# Patient Record
Sex: MALE | Race: OTHER | NOT HISPANIC OR LATINO
[De-identification: names, ages, dates, MRNs, and addresses within clinical notes are randomized per-mention and may not be internally consistent; named-entity substitution may affect disease eponyms.]

---

## 2017-03-15 ENCOUNTER — TRANSCRIPTION ENCOUNTER (OUTPATIENT)
Age: 52
End: 2017-03-15

## 2017-03-15 ENCOUNTER — INPATIENT (INPATIENT)
Facility: HOSPITAL | Age: 52
LOS: 6 days | Discharge: HOME | DRG: 234 | End: 2017-03-22
Attending: THORACIC SURGERY (CARDIOTHORACIC VASCULAR SURGERY) | Admitting: INTERNAL MEDICINE
Payer: COMMERCIAL

## 2017-03-15 ENCOUNTER — INPATIENT (INPATIENT)
Facility: HOSPITAL | Age: 52
LOS: 0 days | Discharge: ANOTHER HOSPITAL | DRG: 281 | End: 2017-03-15
Attending: INTERNAL MEDICINE | Admitting: INTERNAL MEDICINE
Payer: COMMERCIAL

## 2017-03-15 VITALS
TEMPERATURE: 98 F | OXYGEN SATURATION: 100 % | RESPIRATION RATE: 16 BRPM | HEIGHT: 66 IN | WEIGHT: 186.95 LBS | SYSTOLIC BLOOD PRESSURE: 153 MMHG | HEART RATE: 102 BPM | DIASTOLIC BLOOD PRESSURE: 70 MMHG

## 2017-03-15 VITALS
TEMPERATURE: 98 F | SYSTOLIC BLOOD PRESSURE: 123 MMHG | HEART RATE: 81 BPM | RESPIRATION RATE: 16 BRPM | OXYGEN SATURATION: 99 % | DIASTOLIC BLOOD PRESSURE: 76 MMHG

## 2017-03-15 VITALS
HEART RATE: 83 BPM | OXYGEN SATURATION: 100 % | HEIGHT: 66 IN | DIASTOLIC BLOOD PRESSURE: 83 MMHG | RESPIRATION RATE: 16 BRPM | WEIGHT: 186.95 LBS | SYSTOLIC BLOOD PRESSURE: 138 MMHG

## 2017-03-15 DIAGNOSIS — I82.409 ACUTE EMBOLISM AND THROMBOSIS OF UNSPECIFIED DEEP VEINS OF UNSPECIFIED LOWER EXTREMITY: ICD-10-CM

## 2017-03-15 DIAGNOSIS — I21.4 NON-ST ELEVATION (NSTEMI) MYOCARDIAL INFARCTION: ICD-10-CM

## 2017-03-15 DIAGNOSIS — C96.9 MALIGNANT NEOPLASM OF LYMPHOID, HEMATOPOIETIC AND RELATED TISSUE, UNSPECIFIED: ICD-10-CM

## 2017-03-15 DIAGNOSIS — I24.9 ACUTE ISCHEMIC HEART DISEASE, UNSPECIFIED: ICD-10-CM

## 2017-03-15 DIAGNOSIS — I26.99 OTHER PULMONARY EMBOLISM WITHOUT ACUTE COR PULMONALE: ICD-10-CM

## 2017-03-15 DIAGNOSIS — E11.9 TYPE 2 DIABETES MELLITUS WITHOUT COMPLICATIONS: ICD-10-CM

## 2017-03-15 DIAGNOSIS — E78.5 HYPERLIPIDEMIA, UNSPECIFIED: ICD-10-CM

## 2017-03-15 DIAGNOSIS — R07.9 CHEST PAIN, UNSPECIFIED: ICD-10-CM

## 2017-03-15 LAB
ALBUMIN SERPL ELPH-MCNC: 3.1 G/DL — LOW (ref 3.5–5)
ALBUMIN SERPL ELPH-MCNC: 3.3 G/DL — LOW (ref 3.5–5)
ALBUMIN SERPL ELPH-MCNC: 3.7 G/DL — SIGNIFICANT CHANGE UP (ref 3.3–5)
ALP SERPL-CCNC: 34 U/L — LOW (ref 40–120)
ALP SERPL-CCNC: 36 U/L — LOW (ref 40–120)
ALP SERPL-CCNC: 40 U/L — SIGNIFICANT CHANGE UP (ref 40–120)
ALT FLD-CCNC: 29 U/L RC — SIGNIFICANT CHANGE UP (ref 10–45)
ALT FLD-CCNC: 33 U/L DA — SIGNIFICANT CHANGE UP (ref 10–60)
ALT FLD-CCNC: 35 U/L DA — SIGNIFICANT CHANGE UP (ref 10–60)
ANION GAP SERPL CALC-SCNC: 10 MMOL/L — SIGNIFICANT CHANGE UP (ref 5–17)
ANION GAP SERPL CALC-SCNC: 12 MMOL/L — SIGNIFICANT CHANGE UP (ref 5–17)
ANION GAP SERPL CALC-SCNC: 9 MMOL/L — SIGNIFICANT CHANGE UP (ref 5–17)
APPEARANCE UR: CLEAR — SIGNIFICANT CHANGE UP
APTT BLD: 27.8 SEC — SIGNIFICANT CHANGE UP (ref 27.5–37.4)
APTT BLD: 29.4 SEC — SIGNIFICANT CHANGE UP (ref 27.5–37.4)
APTT BLD: 30.1 SEC — SIGNIFICANT CHANGE UP (ref 27.5–37.4)
AST SERPL-CCNC: 20 U/L — SIGNIFICANT CHANGE UP (ref 10–40)
AST SERPL-CCNC: 22 U/L — SIGNIFICANT CHANGE UP (ref 10–40)
AST SERPL-CCNC: 23 U/L — SIGNIFICANT CHANGE UP (ref 10–40)
BASOPHILS # BLD AUTO: 0.2 K/UL — SIGNIFICANT CHANGE UP (ref 0–0.2)
BASOPHILS # BLD AUTO: 0.3 K/UL — HIGH (ref 0–0.2)
BASOPHILS NFR BLD AUTO: 1.7 % — SIGNIFICANT CHANGE UP (ref 0–2)
BASOPHILS NFR BLD AUTO: 1.9 % — SIGNIFICANT CHANGE UP (ref 0–2)
BILIRUB SERPL-MCNC: 0.2 MG/DL — SIGNIFICANT CHANGE UP (ref 0.2–1.2)
BILIRUB SERPL-MCNC: 0.2 MG/DL — SIGNIFICANT CHANGE UP (ref 0.2–1.2)
BILIRUB SERPL-MCNC: 0.3 MG/DL — SIGNIFICANT CHANGE UP (ref 0.2–1.2)
BILIRUB UR-MCNC: NEGATIVE — SIGNIFICANT CHANGE UP
BLD GP AB SCN SERPL QL: NEGATIVE — SIGNIFICANT CHANGE UP
BUN SERPL-MCNC: 17 MG/DL — SIGNIFICANT CHANGE UP (ref 7–23)
BUN SERPL-MCNC: 19 MG/DL — HIGH (ref 7–18)
BUN SERPL-MCNC: 21 MG/DL — HIGH (ref 7–18)
CALCIUM SERPL-MCNC: 7.8 MG/DL — LOW (ref 8.4–10.5)
CALCIUM SERPL-MCNC: 7.9 MG/DL — LOW (ref 8.4–10.5)
CALCIUM SERPL-MCNC: 8.7 MG/DL — SIGNIFICANT CHANGE UP (ref 8.4–10.5)
CHLORIDE SERPL-SCNC: 101 MMOL/L — SIGNIFICANT CHANGE UP (ref 96–108)
CHLORIDE SERPL-SCNC: 105 MMOL/L — SIGNIFICANT CHANGE UP (ref 96–108)
CHLORIDE SERPL-SCNC: 107 MMOL/L — SIGNIFICANT CHANGE UP (ref 96–108)
CK MB BLD-MCNC: 2.2 % — SIGNIFICANT CHANGE UP (ref 0–3.5)
CK MB BLD-MCNC: 2.5 % — SIGNIFICANT CHANGE UP (ref 0–3.5)
CK MB CFR SERPL CALC: 5 NG/ML — HIGH (ref 0–3.6)
CK MB CFR SERPL CALC: 5.2 NG/ML — HIGH (ref 0–3.6)
CK SERPL-CCNC: 197 U/L — SIGNIFICANT CHANGE UP (ref 35–232)
CK SERPL-CCNC: 240 U/L — HIGH (ref 35–232)
CO2 SERPL-SCNC: 22 MMOL/L — SIGNIFICANT CHANGE UP (ref 22–31)
CO2 SERPL-SCNC: 25 MMOL/L — SIGNIFICANT CHANGE UP (ref 22–31)
CO2 SERPL-SCNC: 25 MMOL/L — SIGNIFICANT CHANGE UP (ref 22–31)
COLOR SPEC: SIGNIFICANT CHANGE UP
CREAT SERPL-MCNC: 0.69 MG/DL — SIGNIFICANT CHANGE UP (ref 0.5–1.3)
CREAT SERPL-MCNC: 0.77 MG/DL — SIGNIFICANT CHANGE UP (ref 0.5–1.3)
CREAT SERPL-MCNC: 0.86 MG/DL — SIGNIFICANT CHANGE UP (ref 0.5–1.3)
DIFF PNL FLD: NEGATIVE — SIGNIFICANT CHANGE UP
EOSINOPHIL # BLD AUTO: 0.7 K/UL — HIGH (ref 0–0.5)
EOSINOPHIL # BLD AUTO: 0.8 K/UL — HIGH (ref 0–0.5)
EOSINOPHIL NFR BLD AUTO: 3.9 % — SIGNIFICANT CHANGE UP (ref 0–6)
EOSINOPHIL NFR BLD AUTO: 6 % — SIGNIFICANT CHANGE UP (ref 0–6)
FERRITIN SERPL-MCNC: 594.1 NG/ML — HIGH (ref 30–400)
GLUCOSE SERPL-MCNC: 229 MG/DL — HIGH (ref 70–99)
GLUCOSE SERPL-MCNC: 326 MG/DL — HIGH (ref 70–99)
GLUCOSE SERPL-MCNC: 361 MG/DL — HIGH (ref 70–99)
GLUCOSE UR QL: >1000
HBA1C BLD-MCNC: 10.3 % — HIGH (ref 4–5.6)
HCT VFR BLD CALC: 27.3 % — LOW (ref 39–50)
HCT VFR BLD CALC: 28 % — LOW (ref 39–50)
HCT VFR BLD CALC: 28.1 % — LOW (ref 39–50)
HGB BLD-MCNC: 8.7 G/DL — LOW (ref 13–17)
HGB BLD-MCNC: 8.9 G/DL — LOW (ref 13–17)
HGB BLD-MCNC: 9.5 G/DL — LOW (ref 13–17)
INR BLD: 1.05 RATIO — SIGNIFICANT CHANGE UP (ref 0.88–1.16)
IRON SATN MFR SERPL: 17 % — LOW (ref 20–55)
IRON SATN MFR SERPL: 50 UG/DL — LOW (ref 65–170)
KETONES UR-MCNC: NEGATIVE — SIGNIFICANT CHANGE UP
LEUKOCYTE ESTERASE UR-ACNC: NEGATIVE — SIGNIFICANT CHANGE UP
LYMPHOCYTES # BLD AUTO: 1.9 K/UL — SIGNIFICANT CHANGE UP (ref 1–3.3)
LYMPHOCYTES # BLD AUTO: 14.3 % — SIGNIFICANT CHANGE UP (ref 13–44)
LYMPHOCYTES # BLD AUTO: 14.4 % — SIGNIFICANT CHANGE UP (ref 13–44)
LYMPHOCYTES # BLD AUTO: 2.5 K/UL — SIGNIFICANT CHANGE UP (ref 1–3.3)
MAGNESIUM SERPL-MCNC: 2.3 MG/DL — SIGNIFICANT CHANGE UP (ref 1.8–2.4)
MCHC RBC-ENTMCNC: 27.2 PG — SIGNIFICANT CHANGE UP (ref 27–34)
MCHC RBC-ENTMCNC: 27.3 PG — SIGNIFICANT CHANGE UP (ref 27–34)
MCHC RBC-ENTMCNC: 28 PG — SIGNIFICANT CHANGE UP (ref 27–34)
MCHC RBC-ENTMCNC: 31 GM/DL — LOW (ref 32–36)
MCHC RBC-ENTMCNC: 32.5 GM/DL — SIGNIFICANT CHANGE UP (ref 32–36)
MCHC RBC-ENTMCNC: 34 GM/DL — SIGNIFICANT CHANGE UP (ref 32–36)
MCV RBC AUTO: 82.3 FL — SIGNIFICANT CHANGE UP (ref 80–100)
MCV RBC AUTO: 84 FL — SIGNIFICANT CHANGE UP (ref 80–100)
MCV RBC AUTO: 87.7 FL — SIGNIFICANT CHANGE UP (ref 80–100)
MONOCYTES # BLD AUTO: 0.5 K/UL — SIGNIFICANT CHANGE UP (ref 0–0.9)
MONOCYTES # BLD AUTO: 0.6 K/UL — SIGNIFICANT CHANGE UP (ref 0–0.9)
MONOCYTES NFR BLD AUTO: 3.3 % — SIGNIFICANT CHANGE UP (ref 2–14)
MONOCYTES NFR BLD AUTO: 3.5 % — SIGNIFICANT CHANGE UP (ref 2–14)
NEUTROPHILS # BLD AUTO: 13.2 K/UL — HIGH (ref 1.8–7.4)
NEUTROPHILS # BLD AUTO: 9.7 K/UL — HIGH (ref 1.8–7.4)
NEUTROPHILS NFR BLD AUTO: 74.2 % — SIGNIFICANT CHANGE UP (ref 43–77)
NEUTROPHILS NFR BLD AUTO: 76.8 % — SIGNIFICANT CHANGE UP (ref 43–77)
NITRITE UR-MCNC: NEGATIVE — SIGNIFICANT CHANGE UP
NT-PROBNP SERPL-SCNC: 467 PG/ML — HIGH (ref 0–300)
OB PNL STL: NEGATIVE — SIGNIFICANT CHANGE UP
PA ADP PRP-ACNC: 111 PRU — LOW (ref 194–417)
PH UR: 7 — SIGNIFICANT CHANGE UP (ref 4.8–8)
PHOSPHATE SERPL-MCNC: 2.6 MG/DL — SIGNIFICANT CHANGE UP (ref 2.5–4.5)
PLATELET # BLD AUTO: 253 K/UL — SIGNIFICANT CHANGE UP (ref 150–400)
PLATELET # BLD AUTO: 258 K/UL — SIGNIFICANT CHANGE UP (ref 150–400)
PLATELET # BLD AUTO: 279 K/UL — SIGNIFICANT CHANGE UP (ref 150–400)
POTASSIUM SERPL-MCNC: 4 MMOL/L — SIGNIFICANT CHANGE UP (ref 3.5–5.3)
POTASSIUM SERPL-MCNC: 4.1 MMOL/L — SIGNIFICANT CHANGE UP (ref 3.5–5.3)
POTASSIUM SERPL-MCNC: 4.2 MMOL/L — SIGNIFICANT CHANGE UP (ref 3.5–5.3)
POTASSIUM SERPL-SCNC: 4 MMOL/L — SIGNIFICANT CHANGE UP (ref 3.5–5.3)
POTASSIUM SERPL-SCNC: 4.1 MMOL/L — SIGNIFICANT CHANGE UP (ref 3.5–5.3)
POTASSIUM SERPL-SCNC: 4.2 MMOL/L — SIGNIFICANT CHANGE UP (ref 3.5–5.3)
PROT SERPL-MCNC: 6.1 G/DL — SIGNIFICANT CHANGE UP (ref 6–8.3)
PROT SERPL-MCNC: 6.3 G/DL — SIGNIFICANT CHANGE UP (ref 6–8.3)
PROT SERPL-MCNC: 6.5 G/DL — SIGNIFICANT CHANGE UP (ref 6–8.3)
PROT UR-MCNC: 30 MG/DL
PROTHROM AB SERPL-ACNC: 11.3 SEC — SIGNIFICANT CHANGE UP (ref 10–13.1)
RBC # BLD: 3.16 M/UL — LOW (ref 4.2–5.8)
RBC # BLD: 3.19 M/UL — LOW (ref 4.2–5.8)
RBC # BLD: 3.25 M/UL — LOW (ref 4.2–5.8)
RBC # BLD: 3.41 M/UL — LOW (ref 4.2–5.8)
RBC # FLD: 19.3 % — HIGH (ref 10.3–14.5)
RBC # FLD: 19.3 % — HIGH (ref 10.3–14.5)
RBC # FLD: 20.1 % — HIGH (ref 10.3–14.5)
RETICS #: 22.1 K/UL — LOW (ref 25–125)
RETICS/RBC NFR: 0.7 % — SIGNIFICANT CHANGE UP (ref 0.5–2.5)
RH IG SCN BLD-IMP: POSITIVE — SIGNIFICANT CHANGE UP
SODIUM SERPL-SCNC: 135 MMOL/L — SIGNIFICANT CHANGE UP (ref 135–145)
SODIUM SERPL-SCNC: 140 MMOL/L — SIGNIFICANT CHANGE UP (ref 135–145)
SODIUM SERPL-SCNC: 141 MMOL/L — SIGNIFICANT CHANGE UP (ref 135–145)
SP GR SPEC: 1.03 — HIGH (ref 1.01–1.02)
TIBC SERPL-MCNC: 292 UG/DL — SIGNIFICANT CHANGE UP (ref 250–450)
TROPONIN I SERPL-MCNC: 0.46 NG/ML — HIGH (ref 0–0.04)
TROPONIN I SERPL-MCNC: 1.55 NG/ML — HIGH (ref 0–0.04)
TROPONIN I SERPL-MCNC: 3.18 NG/ML — HIGH (ref 0–0.04)
TSH SERPL-MCNC: 5.71 UU/ML — HIGH (ref 0.34–4.82)
UIBC SERPL-MCNC: 242 UG/DL — SIGNIFICANT CHANGE UP (ref 110–370)
URATE SERPL-MCNC: 1.4 MG/DL — LOW (ref 3.4–8.8)
UROBILINOGEN FLD QL: 1
WBC # BLD: 10.5 K/UL — SIGNIFICANT CHANGE UP (ref 3.8–10.5)
WBC # BLD: 13 K/UL — HIGH (ref 3.8–10.5)
WBC # BLD: 17.3 K/UL — HIGH (ref 3.8–10.5)
WBC # FLD AUTO: 10.5 K/UL — SIGNIFICANT CHANGE UP (ref 3.8–10.5)
WBC # FLD AUTO: 13 K/UL — HIGH (ref 3.8–10.5)
WBC # FLD AUTO: 17.3 K/UL — HIGH (ref 3.8–10.5)

## 2017-03-15 PROCEDURE — 71010: CPT | Mod: 26,59

## 2017-03-15 PROCEDURE — 93880 EXTRACRANIAL BILAT STUDY: CPT | Mod: 26

## 2017-03-15 PROCEDURE — 93306 TTE W/DOPPLER COMPLETE: CPT

## 2017-03-15 PROCEDURE — 82728 ASSAY OF FERRITIN: CPT

## 2017-03-15 PROCEDURE — 83550 IRON BINDING TEST: CPT

## 2017-03-15 PROCEDURE — 83735 ASSAY OF MAGNESIUM: CPT

## 2017-03-15 PROCEDURE — 83036 HEMOGLOBIN GLYCOSYLATED A1C: CPT

## 2017-03-15 PROCEDURE — 84100 ASSAY OF PHOSPHORUS: CPT

## 2017-03-15 PROCEDURE — 71275 CT ANGIOGRAPHY CHEST: CPT

## 2017-03-15 PROCEDURE — 84550 ASSAY OF BLOOD/URIC ACID: CPT

## 2017-03-15 PROCEDURE — 82272 OCCULT BLD FECES 1-3 TESTS: CPT

## 2017-03-15 PROCEDURE — 71046 X-RAY EXAM CHEST 2 VIEWS: CPT

## 2017-03-15 PROCEDURE — 71275 CT ANGIOGRAPHY CHEST: CPT | Mod: 26

## 2017-03-15 PROCEDURE — 99285 EMERGENCY DEPT VISIT HI MDM: CPT | Mod: 25

## 2017-03-15 PROCEDURE — 85045 AUTOMATED RETICULOCYTE COUNT: CPT

## 2017-03-15 PROCEDURE — 84443 ASSAY THYROID STIM HORMONE: CPT

## 2017-03-15 PROCEDURE — 80053 COMPREHEN METABOLIC PANEL: CPT

## 2017-03-15 PROCEDURE — 85730 THROMBOPLASTIN TIME PARTIAL: CPT

## 2017-03-15 PROCEDURE — 82553 CREATINE MB FRACTION: CPT

## 2017-03-15 PROCEDURE — 85027 COMPLETE CBC AUTOMATED: CPT

## 2017-03-15 PROCEDURE — 84484 ASSAY OF TROPONIN QUANT: CPT

## 2017-03-15 PROCEDURE — 93005 ELECTROCARDIOGRAM TRACING: CPT

## 2017-03-15 PROCEDURE — 71020: CPT | Mod: 26

## 2017-03-15 PROCEDURE — 82550 ASSAY OF CK (CPK): CPT

## 2017-03-15 RX ORDER — CLOPIDOGREL BISULFATE 75 MG/1
600 TABLET, FILM COATED ORAL ONCE
Qty: 0 | Refills: 0 | Status: COMPLETED | OUTPATIENT
Start: 2017-03-15 | End: 2017-03-15

## 2017-03-15 RX ORDER — SITAGLIPTIN 50 MG/1
1 TABLET, FILM COATED ORAL
Qty: 0 | Refills: 0 | COMMUNITY

## 2017-03-15 RX ORDER — ASPIRIN/CALCIUM CARB/MAGNESIUM 324 MG
325 TABLET ORAL ONCE
Qty: 0 | Refills: 0 | Status: COMPLETED | OUTPATIENT
Start: 2017-03-15 | End: 2017-03-15

## 2017-03-15 RX ORDER — ASPIRIN/CALCIUM CARB/MAGNESIUM 324 MG
81 TABLET ORAL DAILY
Qty: 0 | Refills: 0 | Status: DISCONTINUED | OUTPATIENT
Start: 2017-03-16 | End: 2017-03-15

## 2017-03-15 RX ORDER — SODIUM CHLORIDE 9 MG/ML
1000 INJECTION INTRAMUSCULAR; INTRAVENOUS; SUBCUTANEOUS
Qty: 0 | Refills: 0 | Status: DISCONTINUED | OUTPATIENT
Start: 2017-03-15 | End: 2017-03-15

## 2017-03-15 RX ORDER — DEXTROSE 50 % IN WATER 50 %
25 SYRINGE (ML) INTRAVENOUS ONCE
Qty: 0 | Refills: 0 | Status: DISCONTINUED | OUTPATIENT
Start: 2017-03-15 | End: 2017-03-17

## 2017-03-15 RX ORDER — DEXTROSE 50 % IN WATER 50 %
12.5 SYRINGE (ML) INTRAVENOUS ONCE
Qty: 0 | Refills: 0 | Status: DISCONTINUED | OUTPATIENT
Start: 2017-03-15 | End: 2017-03-17

## 2017-03-15 RX ORDER — FAMOTIDINE 10 MG/ML
20 INJECTION INTRAVENOUS
Qty: 0 | Refills: 0 | Status: DISCONTINUED | OUTPATIENT
Start: 2017-03-15 | End: 2017-03-15

## 2017-03-15 RX ORDER — HEPARIN SODIUM 5000 [USP'U]/ML
5000 INJECTION INTRAVENOUS; SUBCUTANEOUS EVERY 6 HOURS
Qty: 0 | Refills: 0 | Status: DISCONTINUED | OUTPATIENT
Start: 2017-03-15 | End: 2017-03-17

## 2017-03-15 RX ORDER — ALLOPURINOL 300 MG
300 TABLET ORAL DAILY
Qty: 0 | Refills: 0 | Status: DISCONTINUED | OUTPATIENT
Start: 2017-03-15 | End: 2017-03-15

## 2017-03-15 RX ORDER — SODIUM CHLORIDE 9 MG/ML
1000 INJECTION INTRAMUSCULAR; INTRAVENOUS; SUBCUTANEOUS ONCE
Qty: 0 | Refills: 0 | Status: DISCONTINUED | OUTPATIENT
Start: 2017-03-15 | End: 2017-03-15

## 2017-03-15 RX ORDER — HEPARIN SODIUM 5000 [USP'U]/ML
INJECTION INTRAVENOUS; SUBCUTANEOUS
Qty: 25000 | Refills: 0 | Status: DISCONTINUED | OUTPATIENT
Start: 2017-03-15 | End: 2017-03-15

## 2017-03-15 RX ORDER — HEPARIN SODIUM 5000 [USP'U]/ML
0 INJECTION INTRAVENOUS; SUBCUTANEOUS
Qty: 0 | Refills: 0 | COMMUNITY
Start: 2017-03-15

## 2017-03-15 RX ORDER — ALLOPURINOL 300 MG
300 TABLET ORAL DAILY
Qty: 0 | Refills: 0 | Status: DISCONTINUED | OUTPATIENT
Start: 2017-03-15 | End: 2017-03-17

## 2017-03-15 RX ORDER — ATORVASTATIN CALCIUM 80 MG/1
80 TABLET, FILM COATED ORAL AT BEDTIME
Qty: 0 | Refills: 0 | Status: DISCONTINUED | OUTPATIENT
Start: 2017-03-15 | End: 2017-03-15

## 2017-03-15 RX ORDER — LISINOPRIL 2.5 MG/1
40 TABLET ORAL DAILY
Qty: 0 | Refills: 0 | Status: DISCONTINUED | OUTPATIENT
Start: 2017-03-15 | End: 2017-03-15

## 2017-03-15 RX ORDER — DASATINIB 80 MG/1
100 TABLET ORAL DAILY
Qty: 0 | Refills: 0 | Status: DISCONTINUED | OUTPATIENT
Start: 2017-03-15 | End: 2017-03-17

## 2017-03-15 RX ORDER — METOPROLOL TARTRATE 50 MG
12.5 TABLET ORAL EVERY 12 HOURS
Qty: 0 | Refills: 0 | Status: DISCONTINUED | OUTPATIENT
Start: 2017-03-15 | End: 2017-03-15

## 2017-03-15 RX ORDER — ATORVASTATIN CALCIUM 80 MG/1
80 TABLET, FILM COATED ORAL AT BEDTIME
Qty: 0 | Refills: 0 | Status: DISCONTINUED | OUTPATIENT
Start: 2017-03-15 | End: 2017-03-17

## 2017-03-15 RX ORDER — HEPARIN SODIUM 5000 [USP'U]/ML
INJECTION INTRAVENOUS; SUBCUTANEOUS
Qty: 25000 | Refills: 0 | Status: DISCONTINUED | OUTPATIENT
Start: 2017-03-15 | End: 2017-03-17

## 2017-03-15 RX ORDER — DASATINIB 80 MG/1
100 TABLET ORAL DAILY
Qty: 0 | Refills: 0 | Status: DISCONTINUED | OUTPATIENT
Start: 2017-03-15 | End: 2017-03-15

## 2017-03-15 RX ORDER — INSULIN LISPRO 100/ML
VIAL (ML) SUBCUTANEOUS AT BEDTIME
Qty: 0 | Refills: 0 | Status: DISCONTINUED | OUTPATIENT
Start: 2017-03-15 | End: 2017-03-17

## 2017-03-15 RX ORDER — SODIUM CHLORIDE 9 MG/ML
1000 INJECTION, SOLUTION INTRAVENOUS
Qty: 0 | Refills: 0 | Status: DISCONTINUED | OUTPATIENT
Start: 2017-03-15 | End: 2017-03-17

## 2017-03-15 RX ORDER — INSULIN LISPRO 100/ML
VIAL (ML) SUBCUTANEOUS
Qty: 0 | Refills: 0 | Status: DISCONTINUED | OUTPATIENT
Start: 2017-03-15 | End: 2017-03-17

## 2017-03-15 RX ORDER — SODIUM CHLORIDE 9 MG/ML
500 INJECTION INTRAMUSCULAR; INTRAVENOUS; SUBCUTANEOUS ONCE
Qty: 0 | Refills: 0 | Status: COMPLETED | OUTPATIENT
Start: 2017-03-15 | End: 2017-03-15

## 2017-03-15 RX ORDER — METFORMIN HYDROCHLORIDE 850 MG/1
1 TABLET ORAL
Qty: 0 | Refills: 0 | COMMUNITY

## 2017-03-15 RX ORDER — FENOFIBRATE,MICRONIZED 130 MG
145 CAPSULE ORAL DAILY
Qty: 0 | Refills: 0 | Status: DISCONTINUED | OUTPATIENT
Start: 2017-03-15 | End: 2017-03-17

## 2017-03-15 RX ORDER — GLUCAGON INJECTION, SOLUTION 0.5 MG/.1ML
1 INJECTION, SOLUTION SUBCUTANEOUS ONCE
Qty: 0 | Refills: 0 | Status: DISCONTINUED | OUTPATIENT
Start: 2017-03-15 | End: 2017-03-17

## 2017-03-15 RX ORDER — ASPIRIN/CALCIUM CARB/MAGNESIUM 324 MG
81 TABLET ORAL DAILY
Qty: 0 | Refills: 0 | Status: DISCONTINUED | OUTPATIENT
Start: 2017-03-16 | End: 2017-03-17

## 2017-03-15 RX ORDER — DEXTROSE 50 % IN WATER 50 %
1 SYRINGE (ML) INTRAVENOUS ONCE
Qty: 0 | Refills: 0 | Status: DISCONTINUED | OUTPATIENT
Start: 2017-03-15 | End: 2017-03-17

## 2017-03-15 RX ORDER — ATORVASTATIN CALCIUM 80 MG/1
40 TABLET, FILM COATED ORAL AT BEDTIME
Qty: 0 | Refills: 0 | Status: DISCONTINUED | OUTPATIENT
Start: 2017-03-15 | End: 2017-03-15

## 2017-03-15 RX ORDER — METOPROLOL TARTRATE 50 MG
0 TABLET ORAL
Qty: 0 | Refills: 0 | COMMUNITY
Start: 2017-03-15

## 2017-03-15 RX ORDER — ASPIRIN/CALCIUM CARB/MAGNESIUM 324 MG
0 TABLET ORAL
Qty: 0 | Refills: 0 | COMMUNITY

## 2017-03-15 RX ORDER — METOPROLOL TARTRATE 50 MG
25 TABLET ORAL DAILY
Qty: 0 | Refills: 0 | Status: DISCONTINUED | OUTPATIENT
Start: 2017-03-15 | End: 2017-03-17

## 2017-03-15 RX ORDER — INSULIN LISPRO 100/ML
VIAL (ML) SUBCUTANEOUS
Qty: 0 | Refills: 0 | Status: DISCONTINUED | OUTPATIENT
Start: 2017-03-15 | End: 2017-03-15

## 2017-03-15 RX ADMIN — ATORVASTATIN CALCIUM 80 MILLIGRAM(S): 80 TABLET, FILM COATED ORAL at 22:56

## 2017-03-15 RX ADMIN — LISINOPRIL 40 MILLIGRAM(S): 2.5 TABLET ORAL at 05:32

## 2017-03-15 RX ADMIN — HEPARIN SODIUM 1250 UNIT(S)/HR: 5000 INJECTION INTRAVENOUS; SUBCUTANEOUS at 12:29

## 2017-03-15 RX ADMIN — HEPARIN SODIUM 1000 UNIT(S)/HR: 5000 INJECTION INTRAVENOUS; SUBCUTANEOUS at 22:50

## 2017-03-15 RX ADMIN — HEPARIN SODIUM 1000 UNIT(S)/HR: 5000 INJECTION INTRAVENOUS; SUBCUTANEOUS at 05:25

## 2017-03-15 RX ADMIN — Medication 300 MILLIGRAM(S): at 11:11

## 2017-03-15 RX ADMIN — Medication 12.5 MILLIGRAM(S): at 06:20

## 2017-03-15 RX ADMIN — Medication 4: at 08:44

## 2017-03-15 RX ADMIN — DASATINIB 100 MILLIGRAM(S): 80 TABLET ORAL at 11:14

## 2017-03-15 RX ADMIN — Medication 325 MILLIGRAM(S): at 05:32

## 2017-03-15 RX ADMIN — CLOPIDOGREL BISULFATE 600 MILLIGRAM(S): 75 TABLET, FILM COATED ORAL at 08:45

## 2017-03-15 RX ADMIN — SODIUM CHLORIDE 1000 MILLILITER(S): 9 INJECTION INTRAMUSCULAR; INTRAVENOUS; SUBCUTANEOUS at 05:26

## 2017-03-15 RX ADMIN — SODIUM CHLORIDE 100 MILLILITER(S): 9 INJECTION INTRAMUSCULAR; INTRAVENOUS; SUBCUTANEOUS at 05:34

## 2017-03-15 RX ADMIN — Medication 6: at 12:32

## 2017-03-15 RX ADMIN — Medication 1: at 22:54

## 2017-03-15 NOTE — H&P ADULT. - HISTORY OF PRESENT ILLNESS
52 M from home with PMH HTN, HLD, DM, ?hematologic malignancy (pt started on dasatinib recently for WBC of 63 as per pt and he is not completely sure as to why), p/w c/o squeezing and burning 6-7/10 anterior chest pain starting on R side of the chest and radiating leftward, worsened with exertion and a/w dyspnea last night. Pt tells that he was being worked up for it and was set to go for angiogram 1 month ago, but the procedure was deferred for elevated leukocytosis (as per the patient). He admits that the chest pain he experienced last night, which began at rest, is worse than what he had in the past. Pt denies fever, chills, palpitations, diaphoresis, paresthesias, abdominal pain, cough, personal/Familly hx of VTE. In ED, pt is tachycardic to low 100s, otherwise well-appearing, with + troponin of 0.46, with no ischemic changes on EKG, given full-dose ASA and lipitor. 52 M from home with PMH HTN, HLD, DM, ?hematologic malignancy (pt started on dasatinib recently for WBC of 63 as per pt and he is not completely sure as to why), p/w c/o squeezing and burning 6-7/10 anterior chest pain starting on R side of the chest and radiating leftward, worsened with exertion and a/w dyspnea last night. Pt tells that he was being worked up for it (pt had chest pain during stress test a month ago) and was set to go for angiogram 1 month ago, but the procedure was deferred for elevated leukocytosis (as per the patient). He admits that the chest pain he experienced last night, which began at rest, is worse than what he had in the past. Pt denies fever, chills, palpitations, diaphoresis, paresthesias, abdominal pain, cough, personal/Familly hx of VTE. In ED, pt is tachycardic to low 100s, otherwise well-appearing, with + troponin of 0.46, with no ischemic changes on EKG, given full-dose ASA and lipitor.

## 2017-03-15 NOTE — DISCHARGE NOTE ADULT - PATIENT PORTAL LINK FT
“You can access the [employer] Patient Portal, offered by [facility], by registering with the following website: [email or web address]”

## 2017-03-15 NOTE — H&P ADULT. - RS GEN PE MLT RESP DETAILS PC
clear to auscultation bilaterally/airway patent/breath sounds equal/respirations non-labored/no chest wall tenderness/good air movement

## 2017-03-15 NOTE — DISCHARGE NOTE ADULT - MEDICATION SUMMARY - MEDICATIONS TO TAKE
I will START or STAY ON the medications listed below when I get home from the hospital:    aspirin 81 mg oral delayed release capsule  --  by mouth   -- Indication: For ACS (acute coronary syndrome)    ramipril 10 mg oral capsule  -- 1 cap(s) by mouth once a day  -- Indication: For ACS (acute coronary syndrome)    heparin 100 units/mL-D5% intravenous solution  --  intravenous   -- Indication: For ACS (acute coronary syndrome)    allopurinol 300 mg oral tablet  -- 1 tab(s) by mouth once a day  -- Indication: For Hematologic malignancy    Lipitor 80 mg oral tablet  -- 1 tab(s) by mouth once a day  -- Indication: For ACS (acute coronary syndrome)    fenofibric acid 135 mg oral delayed release capsule  -- 1 cap(s) by mouth once a day  -- Indication: For ACS (acute coronary syndrome)    dasatinib 100 mg oral tablet  -- 1 tab(s) by mouth once a day  -- Indication: For Hematologic malignancy    metoprolol  --     -- Indication: For ACS (acute coronary syndrome)

## 2017-03-15 NOTE — ED PROVIDER NOTE - MEDICAL DECISION MAKING DETAILS
51 y/o M pt presents with chest pain. Will get labs and admit. Pt took Aspirin already. Admit to r/o ACS. PMD is Dr. Wray. 53 y/o M pt presents with chest pain. Will get labs and admit. Pt took Aspirin already. Admit to r/o ACS. PMD is Dr. Otero. 53 y/o M pt presents with chest pain. Will get labs and admit. ASA 325mg po given. Admit to r/o ACS. PMD is Dr. Otero.

## 2017-03-15 NOTE — H&P ADULT. - PROBLEM SELECTOR PLAN 2
c/w statin therapy per home dose  f/u lipid profile  calculate ASCVD risk score hold oral hypoglycemics and continue with correction scale of insulin  f/u a1c and FS goal of 130-160 inpatient

## 2017-03-15 NOTE — H&P ADULT. - ATTENDING COMMENTS
Above 52 M from home with PMH HTN, HLD, DM, ?hematologic malignancy (pt started on dasatinib recently for WBC of 63 as per pt and he is not completely sure as to why), p/w c/o squeezing and burning 6-7/10 anterior chest pain starting on R side of the chest and radiating leftward, worsened with exertion and a/w dyspnea last night. Pt tells that he was being worked up for it (pt had chest pain during stress test a month ago) and was set to go for angiogram 1 month ago, but the procedure was deferred for elevated leukocytosis (as per the patient). He admits that the chest pain he experienced last night, which began at rest, is worse than what he had in the past. Pt denies fever, chills, palpitations, diaphoresis, paresthesias, abdominal pain, cough, personal/Familly hx of VTE. In ED, pt is tachycardic to low 100s, otherwise well-appearing, with + troponin of 0.46, with no ischemic changes on EKG, given full-dose ASA and lipitor  Blood test and radiology report reviewed   Impression ACS HTN Hyperlipidemia,Anemia CLL,DM type 2 with hyperglycemia   Admit to telemetry as per protocol Cardiology evaluation Needs cardiac cath Continue home meds  Insulin coverage  GI DVT prophylaxis

## 2017-03-15 NOTE — ED ADULT NURSE REASSESSMENT NOTE - NS ED NURSE REASSESS COMMENT FT1
assumed care of pt in holding area from NATALIYA Bernal pt a&ox3, ambulatory. admitted for CP. 20G RAC. pt on heparin drip at 1000units/hr. offers no complaints at this time. sleeping comfortably in bed.

## 2017-03-15 NOTE — ED PROVIDER NOTE - OBJECTIVE STATEMENT
51 y/o M pt w/ PMHx of DM presents to the ED for chest peressure today. Pt describes the pressure as a burning sensation in chest lasting few minutes which was onset while he was at work today. Pt has a hx of intermittent CP for one montnh. Dr. Lou is the pt's dr for cardiology and the pt has had an outpatient cardiac catheter scheduled. Pt could not get it b/c his white blood count was 63. Pt has been following treatment and still has had some mild pressure on the R side of chest. Pt denies diaphoresis, fevers/chills, calf pain, or any other complaints. NKDA.

## 2017-03-15 NOTE — ED PROVIDER NOTE - NS ED MD SCRIBE ATTENDING SCRIBE SECTIONS
DISPOSITION/HIV/PHYSICAL EXAM/HISTORY OF PRESENT ILLNESS/VITAL SIGNS( Pullset)/REVIEW OF SYSTEMS/PAST MEDICAL/SURGICAL/SOCIAL HISTORY

## 2017-03-15 NOTE — DISCHARGE NOTE ADULT - HOSPITAL COURSE
52 M from home with PMH HTN, HLD, DM, ?hematologic malignancy (pt started on dasatinib recently for WBC of 63 as per pt and he is not completely sure as to why), p/w c/o squeezing and burning 6-7/10 anterior chest pain starting on R side of the chest and radiating leftward, worsened with exertion and a/w dyspnea last night. Pt tells that he was being worked up for it (pt had chest pain during stress test a month ago) and was set to go for angiogram 1 month ago, but the procedure was deferred for elevated leukocytosis (as per the patient). He admits that the chest pain he experienced last night, which began at rest, is worse than what he had in the past. Pt denies fever, chills, palpitations, diaphoresis, paresthesias, abdominal pain, cough, personal/Familly hx of VTE. In ED, pt is tachycardic to low 100s, otherwise well-appearing, with + troponin of 0.46, with no ischemic changes on EKG, given full-dose ASA and lipitor. CTA chest done to r/o PE given pleurisy and likely malignancy.    Patient admitted with Acute MI and treated with ASA and plavix load, bblocker, high-dose statin, and anticoagulation. Cardiac enzymes were trended and pt hemodynamics were closely monitored on telemetry. ECHO ordered and cardiology evaluation consulted. Pt to be transferred for cardiac catheterization 52 M from home with PMH HTN, HLD, DM, ?hematologic malignancy (pt started on dasatinib recently for WBC of 63 as per pt and he is not completely sure as to why), p/w c/o squeezing and burning 6-7/10 anterior chest pain starting on R side of the chest and radiating leftward, worsened with exertion and a/w dyspnea last night. Pt tells that he was being worked up for it (pt had chest pain during stress test a month ago) and was set to go for angiogram 1 month ago, but the procedure was deferred for elevated leukocytosis (as per the patient). He admits that the chest pain he experienced last night, which began at rest, is worse than what he had in the past. Pt denies fever, chills, palpitations, diaphoresis, paresthesias, abdominal pain, cough, personal/Familly hx of VTE. In ED, pt is tachycardic to low 100s, otherwise well-appearing, with + troponin of 0.46, with no ischemic changes on EKG, given full-dose ASA and lipitor. CTA chest done to r/o PE given pleurisy and likely malignancy.    Patient admitted with Acute MI and treated with ASA and plavix load, bblocker, high-dose statin, and anticoagulation. Cardiac enzymes were trended and pt hemodynamics were closely monitored on telemetry. ECHO ordered and cardiology evaluation consulted. Pt to be transferred [facility] for cardiac catheterization.  Receiving physician is Dr. Rudd. 52 M from home with PMH HTN, HLD, DM, ?hematologic malignancy (pt started on dasatinib recently for WBC of 63 as per pt and he is not completely sure as to why), p/w c/o squeezing and burning 6-7/10 anterior chest pain starting on R side of the chest and radiating leftward, worsened with exertion and a/w dyspnea last night. Pt tells that he was being worked up for it (pt had chest pain during stress test a month ago) and was set to go for angiogram 1 month ago, but the procedure was deferred for elevated leukocytosis (as per the patient). He admits that the chest pain he experienced last night, which began at rest, is worse than what he had in the past. Pt denies fever, chills, palpitations, diaphoresis, paresthesias, abdominal pain, cough, personal/Familly hx of VTE. In ED, pt is tachycardic to low 100s, otherwise well-appearing, with + troponin of 0.46, with no ischemic changes on EKG, given full-dose ASA and lipitor. CTA chest done to r/o PE given pleurisy and likely malignancy.  CTA negative for PE.    Patient admitted with Acute MI and treated with ASA and plavix load, bblocker, high-dose statin, and anticoagulation. Cardiac enzymes were trended and positive.  Pt hemodynamics were closely monitored on telemetry. ECHO ordered and cardiology evaluation with Dr. Callaway consulted. Dr. Callaway ordered transfer to [facility] for cardiac catheterization.  Receiving physician is Dr. Rudd.

## 2017-03-15 NOTE — H&P ADULT. - PROBLEM SELECTOR PLAN 4
presumptive hematologic malignancy as pt is on dasatinib  likely etiology for leukocytosis  pt without any evidence of infection on ROS or clinical exam.

## 2017-03-15 NOTE — DISCHARGE NOTE ADULT - CARE PLAN
Principal Discharge DX:	ACS (acute coronary syndrome)  Goal:	resolution  Instructions for follow-up, activity and diet:	c/w antiplatelet therapy, statin, bblocker, ACEi

## 2017-03-15 NOTE — H&P CARDIOLOGY - HISTORY OF PRESENT ILLNESS
52 M with PMH HTN, HLD, DMT2 ( A1c= 10.3, uncomplicated & controlled as per patient), CML (pt started on dasatinib recently for WBC of 63 as per pt and he is not completely sure as to why) transferred from [facility] today for cardiac cath. Patient presented to [facility] on c/o squeezing and burning 6-7/10 anterior chest pain starting on R side of the chest and radiating leftward, worsened with exertion and a/w dyspnea last night. Pt tells that he was being worked up for it (pt had chest pain during stress test a month ago) and was set to go for angiogram 1 month ago, but the procedure was deferred for elevated leukocytosis (as per the patient). He admits that the chest pain he experienced last night, which began at rest, is worse than what he had in the past. Pt denies fever, chills, palpitations, diaphoresis, paresthesias, abdominal pain, cough, personal/Familly hx of VTE. In ED, pt is tachycardic to low 100s, otherwise well-appearing, with + troponin of 0.46, with no ischemic changes on EKG, given full-dose ASA and lipitor. 52 M with PMH HTN, HLD, DMT2 ( A1c= 10.3, uncomplicated & controlled as per patient), CML (pt started on dasatinib recently for WBC of 63 as per pt) and gout transferred from [facility] today for cardiac cath. Patient presented to [facility] earlier today c/o squeezing and burning 6-7/10 anterior chest pain starting on right side of the chest and radiating to RUE, worsened with moderate exertion and associated with dyspnea. Patient reports the pain started last night, went to bed but woke up around 4am with worsening CP. Arrived to [facility], ACS protocol initiated no ischemic changes on ECG in [facility]. TROP I peaked at 3.180, CPK 2.5, CKMB 5. Patient then transferred to [facility] for cardiac cath for further cardiac evaluation. Upon arrival to cath lab pt denies fever, chills, palpitations, diaphoresis, paresthesias, abdominal pain, cough.    Off Note: Pt reports having similar symptoms one month ago, had a positive stress test and was referred for cardiac cath however, the procedure was deferred for elevated leukocytosis (as per the patient) 52 M with PMH HTN, HLD, DMT2 ( A1c= 10.3, uncomplicated & controlled as per patient), CML (pt started on dasatinib recently for WBC of 63 as per pt) and gout transferred from [facility] today for cardiac cath. Patient presented to [facility] earlier today c/o squeezing and burning 6-7/10 anterior chest pain starting on right side of the chest and radiating to RUE, worsened with moderate exertion and associated with dyspnea. Patient reports the pain started last night, went to bed but woke up around 4am with worsening CP. Arrived to [facility], ACS protocol initiated no ischemic changes on ECG in [facility]. TROP I peaked at 3.180, CPK 2.5, CKMB 5. ECHO completed: EF >55%, normal. CTA Chest: negative for PE, CXR: negative. Patient then transferred to [facility] for cardiac cath for further cardiac evaluation. Upon arrival to cath lab pt denies fever, chills, palpitations, diaphoresis, PND, orthopnea, SOB.    Off Note: Pt reports having similar symptoms one month ago, had a positive stress test and was referred for cardiac cath however, the procedure was deferred for elevated leukocytosis (as per the patient)

## 2017-03-15 NOTE — ED ADULT NURSE NOTE - OBJECTIVE STATEMENT
Pt. is a&ox3 and stable. Pt. c/o chest burning that started at 10 pm. Pt. denies any radiation and states that he feels chest tightness on exertion. Pt. stated that "right now it's good I don't feel pain". .

## 2017-03-15 NOTE — H&P ADULT. - PROBLEM SELECTOR PLAN 3
presumptive hematologic malignancy as pt is on dasatinib  likely etiology for leukocytosis  pt without any evidence of infection on ROS or clinical exam. c/w statin therapy per home dose  f/u lipid profile  calculate ASCVD risk score

## 2017-03-15 NOTE — H&P ADULT. - ASSESSMENT
52 m with cardiac risk factors and possible hematologic malignancy p/w anterior chest pain and exertional dyspnea with + troponin admitted for ACS and r/o PE

## 2017-03-15 NOTE — DISCHARGE NOTE ADULT - CARE PROVIDER_API CALL
Ninoska Callaway), Cardiology; Internal Medicine  [address] [place] [id number]  Phone: [phone]  Fax: [phone]

## 2017-03-15 NOTE — H&P ADULT. - PROBLEM SELECTOR PLAN 1
hold oral hypoglycemics and continue with correction scale of insulin  f/u a1c and FS goal of 130-160 inpatient pt with chest pain radiating to L chest a/w exertional dyspnea  given risk factors (likely malignancy) and fact that pt also noted some pleurisy, CTA done to r/o PE. Will trend CE x3 and treat with ACS protocol medication  f/u ECHO  pt had + finding on recent stress test (developed CP), and was to go for angio.  cardiology Dr Callaway

## 2017-03-15 NOTE — H&P ADULT. - MUSCULOSKELETAL
negative detailed exam no joint warmth/ROM intact/no joint erythema/no joint swelling/no calf tenderness

## 2017-03-16 LAB
ALBUMIN SERPL ELPH-MCNC: 3.6 G/DL — SIGNIFICANT CHANGE UP (ref 3.3–5)
ALP SERPL-CCNC: 36 U/L — LOW (ref 40–120)
ALT FLD-CCNC: 29 U/L RC — SIGNIFICANT CHANGE UP (ref 10–45)
ANION GAP SERPL CALC-SCNC: 12 MMOL/L — SIGNIFICANT CHANGE UP (ref 5–17)
APTT BLD: 32.3 SEC — SIGNIFICANT CHANGE UP (ref 27.5–37.4)
APTT BLD: 34.5 SEC — SIGNIFICANT CHANGE UP (ref 27.5–37.4)
APTT BLD: 46.5 SEC — HIGH (ref 27.5–37.4)
AST SERPL-CCNC: 19 U/L — SIGNIFICANT CHANGE UP (ref 10–40)
BILIRUB SERPL-MCNC: 0.4 MG/DL — SIGNIFICANT CHANGE UP (ref 0.2–1.2)
BUN SERPL-MCNC: 15 MG/DL — SIGNIFICANT CHANGE UP (ref 7–23)
CALCIUM SERPL-MCNC: 8.7 MG/DL — SIGNIFICANT CHANGE UP (ref 8.4–10.5)
CHLORIDE SERPL-SCNC: 102 MMOL/L — SIGNIFICANT CHANGE UP (ref 96–108)
CHOLEST SERPL-MCNC: 131 MG/DL — SIGNIFICANT CHANGE UP (ref 10–199)
CO2 SERPL-SCNC: 23 MMOL/L — SIGNIFICANT CHANGE UP (ref 22–31)
CREAT SERPL-MCNC: 0.75 MG/DL — SIGNIFICANT CHANGE UP (ref 0.5–1.3)
GLUCOSE SERPL-MCNC: 180 MG/DL — HIGH (ref 70–99)
HBA1C BLD-MCNC: 10.8 % — HIGH (ref 4–5.6)
HCT VFR BLD CALC: 28.2 % — LOW (ref 39–50)
HGB BLD-MCNC: 9.1 G/DL — LOW (ref 13–17)
MCHC RBC-ENTMCNC: 27.5 PG — SIGNIFICANT CHANGE UP (ref 27–34)
MCHC RBC-ENTMCNC: 32.3 GM/DL — SIGNIFICANT CHANGE UP (ref 32–36)
MCV RBC AUTO: 85.1 FL — SIGNIFICANT CHANGE UP (ref 80–100)
MRSA PCR RESULT.: SIGNIFICANT CHANGE UP
PA ADP PRP-ACNC: 64 PRU — LOW (ref 194–417)
PLATELET # BLD AUTO: 219 K/UL — SIGNIFICANT CHANGE UP (ref 150–400)
POTASSIUM SERPL-MCNC: 4.3 MMOL/L — SIGNIFICANT CHANGE UP (ref 3.5–5.3)
POTASSIUM SERPL-SCNC: 4.3 MMOL/L — SIGNIFICANT CHANGE UP (ref 3.5–5.3)
PROT SERPL-MCNC: 6.1 G/DL — SIGNIFICANT CHANGE UP (ref 6–8.3)
RBC # BLD: 3.32 M/UL — LOW (ref 4.2–5.8)
RBC # FLD: 18.9 % — HIGH (ref 10.3–14.5)
RH IG SCN BLD-IMP: POSITIVE — SIGNIFICANT CHANGE UP
S AUREUS DNA NOSE QL NAA+PROBE: SIGNIFICANT CHANGE UP
SODIUM SERPL-SCNC: 137 MMOL/L — SIGNIFICANT CHANGE UP (ref 135–145)
WBC # BLD: 7.6 K/UL — SIGNIFICANT CHANGE UP (ref 3.8–10.5)
WBC # FLD AUTO: 7.6 K/UL — SIGNIFICANT CHANGE UP (ref 3.8–10.5)

## 2017-03-16 PROCEDURE — 99222 1ST HOSP IP/OBS MODERATE 55: CPT

## 2017-03-16 PROCEDURE — 94010 BREATHING CAPACITY TEST: CPT | Mod: 26

## 2017-03-16 RX ORDER — INSULIN LISPRO 100/ML
6 VIAL (ML) SUBCUTANEOUS
Qty: 0 | Refills: 0 | Status: DISCONTINUED | OUTPATIENT
Start: 2017-03-16 | End: 2017-03-17

## 2017-03-16 RX ORDER — INSULIN GLARGINE 100 [IU]/ML
10 INJECTION, SOLUTION SUBCUTANEOUS AT BEDTIME
Qty: 0 | Refills: 0 | Status: DISCONTINUED | OUTPATIENT
Start: 2017-03-16 | End: 2017-03-17

## 2017-03-16 RX ORDER — CHLORHEXIDINE GLUCONATE 213 G/1000ML
1 SOLUTION TOPICAL ONCE
Qty: 0 | Refills: 0 | Status: COMPLETED | OUTPATIENT
Start: 2017-03-16 | End: 2017-03-16

## 2017-03-16 RX ORDER — CEFUROXIME AXETIL 250 MG
1500 TABLET ORAL ONCE
Qty: 0 | Refills: 0 | Status: DISCONTINUED | OUTPATIENT
Start: 2017-03-16 | End: 2017-03-17

## 2017-03-16 RX ORDER — METOPROLOL TARTRATE 50 MG
12.5 TABLET ORAL
Qty: 0 | Refills: 0 | Status: DISCONTINUED | OUTPATIENT
Start: 2017-03-16 | End: 2017-03-16

## 2017-03-16 RX ORDER — CHLORHEXIDINE GLUCONATE 213 G/1000ML
20 SOLUTION TOPICAL ONCE
Qty: 0 | Refills: 0 | Status: COMPLETED | OUTPATIENT
Start: 2017-03-16 | End: 2017-03-17

## 2017-03-16 RX ADMIN — HEPARIN SODIUM 1400 UNIT(S)/HR: 5000 INJECTION INTRAVENOUS; SUBCUTANEOUS at 13:13

## 2017-03-16 RX ADMIN — Medication 81 MILLIGRAM(S): at 11:43

## 2017-03-16 RX ADMIN — Medication 6 UNIT(S): at 16:55

## 2017-03-16 RX ADMIN — ATORVASTATIN CALCIUM 80 MILLIGRAM(S): 80 TABLET, FILM COATED ORAL at 22:06

## 2017-03-16 RX ADMIN — Medication 1: at 07:47

## 2017-03-16 RX ADMIN — Medication 300 MILLIGRAM(S): at 11:43

## 2017-03-16 RX ADMIN — Medication 145 MILLIGRAM(S): at 11:43

## 2017-03-16 RX ADMIN — INSULIN GLARGINE 10 UNIT(S): 100 INJECTION, SOLUTION SUBCUTANEOUS at 22:06

## 2017-03-16 RX ADMIN — CHLORHEXIDINE GLUCONATE 1 APPLICATION(S): 213 SOLUTION TOPICAL at 21:11

## 2017-03-16 RX ADMIN — Medication 25 MILLIGRAM(S): at 05:35

## 2017-03-16 RX ADMIN — DASATINIB 100 MILLIGRAM(S): 80 TABLET ORAL at 11:43

## 2017-03-16 RX ADMIN — HEPARIN SODIUM 1650 UNIT(S)/HR: 5000 INJECTION INTRAVENOUS; SUBCUTANEOUS at 22:06

## 2017-03-16 RX ADMIN — HEPARIN SODIUM 5000 UNIT(S): 5000 INJECTION INTRAVENOUS; SUBCUTANEOUS at 06:14

## 2017-03-16 RX ADMIN — Medication 6 UNIT(S): at 11:43

## 2017-03-16 RX ADMIN — Medication 2: at 11:42

## 2017-03-16 RX ADMIN — HEPARIN SODIUM 5000 UNIT(S): 5000 INJECTION INTRAVENOUS; SUBCUTANEOUS at 22:50

## 2017-03-16 RX ADMIN — Medication 1: at 16:54

## 2017-03-16 RX ADMIN — HEPARIN SODIUM 1250 UNIT(S)/HR: 5000 INJECTION INTRAVENOUS; SUBCUTANEOUS at 06:13

## 2017-03-17 ENCOUNTER — TRANSCRIPTION ENCOUNTER (OUTPATIENT)
Age: 52
End: 2017-03-17

## 2017-03-17 ENCOUNTER — APPOINTMENT (OUTPATIENT)
Dept: CARDIOTHORACIC SURGERY | Facility: HOSPITAL | Age: 52
End: 2017-03-17

## 2017-03-17 PROBLEM — Z00.00 ENCOUNTER FOR PREVENTIVE HEALTH EXAMINATION: Status: ACTIVE | Noted: 2017-03-17

## 2017-03-17 LAB
ALBUMIN SERPL ELPH-MCNC: 3.9 G/DL — SIGNIFICANT CHANGE UP (ref 3.3–5)
ALP SERPL-CCNC: 30 U/L — LOW (ref 40–120)
ALT FLD-CCNC: 21 U/L RC — SIGNIFICANT CHANGE UP (ref 10–45)
ANION GAP SERPL CALC-SCNC: 13 MMOL/L — SIGNIFICANT CHANGE UP (ref 5–17)
APTT BLD: 26.4 SEC — LOW (ref 27.5–37.4)
APTT BLD: 70.8 SEC — HIGH (ref 27.5–37.4)
AST SERPL-CCNC: 28 U/L — SIGNIFICANT CHANGE UP (ref 10–40)
BASOPHILS # BLD AUTO: 0.2 K/UL — SIGNIFICANT CHANGE UP (ref 0–0.2)
BILIRUB SERPL-MCNC: 0.7 MG/DL — SIGNIFICANT CHANGE UP (ref 0.2–1.2)
BUN SERPL-MCNC: 16 MG/DL — SIGNIFICANT CHANGE UP (ref 7–23)
CALCIUM SERPL-MCNC: 8 MG/DL — LOW (ref 8.4–10.5)
CHLORIDE SERPL-SCNC: 106 MMOL/L — SIGNIFICANT CHANGE UP (ref 96–108)
CK MB BLD-MCNC: 9.7 % — HIGH (ref 0–3.5)
CK MB CFR SERPL CALC: 25.6 NG/ML — HIGH (ref 0–6.7)
CK SERPL-CCNC: 264 U/L — HIGH (ref 30–200)
CO2 SERPL-SCNC: 22 MMOL/L — SIGNIFICANT CHANGE UP (ref 22–31)
CREAT SERPL-MCNC: 0.59 MG/DL — SIGNIFICANT CHANGE UP (ref 0.5–1.3)
EOSINOPHIL # BLD AUTO: 0.3 K/UL — SIGNIFICANT CHANGE UP (ref 0–0.5)
FIBRINOGEN PPP-MCNC: 365 MG/DL — SIGNIFICANT CHANGE UP (ref 255–510)
GAS PNL BLDA: SIGNIFICANT CHANGE UP
GLUCOSE SERPL-MCNC: 172 MG/DL — HIGH (ref 70–99)
HCT VFR BLD CALC: 29.5 % — LOW (ref 39–50)
HGB BLD-MCNC: 9.9 G/DL — LOW (ref 13–17)
HYPOCHROMIA BLD QL: SLIGHT — SIGNIFICANT CHANGE UP
INR BLD: 1.27 RATIO — HIGH (ref 0.88–1.16)
LYMPHOCYTES # BLD AUTO: 1.2 K/UL — SIGNIFICANT CHANGE UP (ref 1–3.3)
LYMPHOCYTES # BLD AUTO: 2 % — LOW (ref 13–44)
MCHC RBC-ENTMCNC: 28.6 PG — SIGNIFICANT CHANGE UP (ref 27–34)
MCHC RBC-ENTMCNC: 33.4 GM/DL — SIGNIFICANT CHANGE UP (ref 32–36)
MCV RBC AUTO: 85.4 FL — SIGNIFICANT CHANGE UP (ref 80–100)
MONOCYTES # BLD AUTO: 0.7 K/UL — SIGNIFICANT CHANGE UP (ref 0–0.9)
MONOCYTES NFR BLD AUTO: 1 % — LOW (ref 2–14)
NEUTROPHILS # BLD AUTO: 30.3 K/UL — HIGH (ref 1.8–7.4)
NEUTROPHILS NFR BLD AUTO: 97 % — HIGH (ref 43–77)
PA ADP PRP-ACNC: 158 PRU — LOW (ref 194–417)
PLAT MORPH BLD: NORMAL — SIGNIFICANT CHANGE UP
PLATELET # BLD AUTO: 156 K/UL — SIGNIFICANT CHANGE UP (ref 150–400)
POLYCHROMASIA BLD QL SMEAR: SLIGHT — SIGNIFICANT CHANGE UP
POTASSIUM SERPL-MCNC: 4.3 MMOL/L — SIGNIFICANT CHANGE UP (ref 3.5–5.3)
POTASSIUM SERPL-SCNC: 4.3 MMOL/L — SIGNIFICANT CHANGE UP (ref 3.5–5.3)
PROT SERPL-MCNC: 5.5 G/DL — LOW (ref 6–8.3)
PROTHROM AB SERPL-ACNC: 13.8 SEC — HIGH (ref 10–13.1)
RBC # BLD: 3.45 M/UL — LOW (ref 4.2–5.8)
RBC # FLD: 17.3 % — HIGH (ref 10.3–14.5)
RBC BLD AUTO: ABNORMAL
SODIUM SERPL-SCNC: 141 MMOL/L — SIGNIFICANT CHANGE UP (ref 135–145)
TROPONIN T SERPL-MCNC: 0.62 NG/ML — HIGH (ref 0–0.06)
WBC # BLD: 32.8 K/UL — HIGH (ref 3.8–10.5)
WBC # FLD AUTO: 32.8 K/UL — HIGH (ref 3.8–10.5)

## 2017-03-17 PROCEDURE — 33519 CABG ARTERY-VEIN THREE: CPT

## 2017-03-17 PROCEDURE — 93010 ELECTROCARDIOGRAM REPORT: CPT

## 2017-03-17 PROCEDURE — 33535 CABG ARTERIAL THREE: CPT

## 2017-03-17 PROCEDURE — 33508 ENDOSCOPIC VEIN HARVEST: CPT

## 2017-03-17 PROCEDURE — 71010: CPT | Mod: 26

## 2017-03-17 RX ORDER — INSULIN HUMAN 100 [IU]/ML
1 INJECTION, SOLUTION SUBCUTANEOUS
Qty: 100 | Refills: 0 | Status: DISCONTINUED | OUTPATIENT
Start: 2017-03-17 | End: 2017-03-18

## 2017-03-17 RX ORDER — SODIUM CHLORIDE 9 MG/ML
1000 INJECTION INTRAMUSCULAR; INTRAVENOUS; SUBCUTANEOUS
Qty: 0 | Refills: 0 | Status: DISCONTINUED | OUTPATIENT
Start: 2017-03-17 | End: 2017-03-22

## 2017-03-17 RX ORDER — POTASSIUM CHLORIDE 20 MEQ
10 PACKET (EA) ORAL
Qty: 0 | Refills: 0 | Status: COMPLETED | OUTPATIENT
Start: 2017-03-17 | End: 2017-03-17

## 2017-03-17 RX ORDER — POTASSIUM CHLORIDE 20 MEQ
10 PACKET (EA) ORAL ONCE
Qty: 0 | Refills: 0 | Status: DISCONTINUED | OUTPATIENT
Start: 2017-03-17 | End: 2017-03-18

## 2017-03-17 RX ORDER — POTASSIUM CHLORIDE 20 MEQ
10 PACKET (EA) ORAL ONCE
Qty: 0 | Refills: 0 | Status: COMPLETED | OUTPATIENT
Start: 2017-03-17 | End: 2017-03-17

## 2017-03-17 RX ORDER — DOCUSATE SODIUM 100 MG
100 CAPSULE ORAL THREE TIMES A DAY
Qty: 0 | Refills: 0 | Status: DISCONTINUED | OUTPATIENT
Start: 2017-03-17 | End: 2017-03-22

## 2017-03-17 RX ORDER — CEFUROXIME AXETIL 250 MG
1500 TABLET ORAL EVERY 8 HOURS
Qty: 0 | Refills: 0 | Status: COMPLETED | OUTPATIENT
Start: 2017-03-17 | End: 2017-03-19

## 2017-03-17 RX ORDER — SODIUM CHLORIDE 9 MG/ML
1000 INJECTION, SOLUTION INTRAVENOUS
Qty: 0 | Refills: 0 | Status: DISCONTINUED | OUTPATIENT
Start: 2017-03-17 | End: 2017-03-18

## 2017-03-17 RX ORDER — ACETAMINOPHEN 500 MG
1000 TABLET ORAL ONCE
Qty: 0 | Refills: 0 | Status: COMPLETED | OUTPATIENT
Start: 2017-03-17 | End: 2017-03-17

## 2017-03-17 RX ORDER — POTASSIUM CHLORIDE 20 MEQ
10 PACKET (EA) ORAL
Qty: 0 | Refills: 0 | Status: DISCONTINUED | OUTPATIENT
Start: 2017-03-17 | End: 2017-03-18

## 2017-03-17 RX ORDER — PROPOFOL 10 MG/ML
5 INJECTION, EMULSION INTRAVENOUS
Qty: 500 | Refills: 0 | Status: DISCONTINUED | OUTPATIENT
Start: 2017-03-17 | End: 2017-03-17

## 2017-03-17 RX ORDER — MEPERIDINE HYDROCHLORIDE 50 MG/ML
25 INJECTION INTRAMUSCULAR; INTRAVENOUS; SUBCUTANEOUS ONCE
Qty: 0 | Refills: 0 | Status: DISCONTINUED | OUTPATIENT
Start: 2017-03-17 | End: 2017-03-17

## 2017-03-17 RX ORDER — ASPIRIN/CALCIUM CARB/MAGNESIUM 324 MG
325 TABLET ORAL DAILY
Qty: 0 | Refills: 0 | Status: DISCONTINUED | OUTPATIENT
Start: 2017-03-17 | End: 2017-03-17

## 2017-03-17 RX ORDER — HYDROMORPHONE HYDROCHLORIDE 2 MG/ML
0.5 INJECTION INTRAMUSCULAR; INTRAVENOUS; SUBCUTANEOUS ONCE
Qty: 0 | Refills: 0 | Status: DISCONTINUED | OUTPATIENT
Start: 2017-03-17 | End: 2017-03-17

## 2017-03-17 RX ORDER — ASPIRIN/CALCIUM CARB/MAGNESIUM 324 MG
81 TABLET ORAL DAILY
Qty: 0 | Refills: 0 | Status: DISCONTINUED | OUTPATIENT
Start: 2017-03-17 | End: 2017-03-22

## 2017-03-17 RX ORDER — CHLORHEXIDINE GLUCONATE 213 G/1000ML
5 SOLUTION TOPICAL EVERY 4 HOURS
Qty: 0 | Refills: 0 | Status: DISCONTINUED | OUTPATIENT
Start: 2017-03-17 | End: 2017-03-17

## 2017-03-17 RX ORDER — FENTANYL CITRATE 50 UG/ML
25 INJECTION INTRAVENOUS ONCE
Qty: 0 | Refills: 0 | Status: DISCONTINUED | OUTPATIENT
Start: 2017-03-17 | End: 2017-03-17

## 2017-03-17 RX ORDER — FAMOTIDINE 10 MG/ML
20 INJECTION INTRAVENOUS EVERY 12 HOURS
Qty: 0 | Refills: 0 | Status: DISCONTINUED | OUTPATIENT
Start: 2017-03-17 | End: 2017-03-18

## 2017-03-17 RX ADMIN — SODIUM CHLORIDE 10 MILLILITER(S): 9 INJECTION INTRAMUSCULAR; INTRAVENOUS; SUBCUTANEOUS at 18:34

## 2017-03-17 RX ADMIN — Medication 1000 MILLIGRAM(S): at 23:55

## 2017-03-17 RX ADMIN — Medication 100 MILLIGRAM(S): at 21:02

## 2017-03-17 RX ADMIN — INSULIN HUMAN 1 UNIT(S)/HR: 100 INJECTION, SOLUTION SUBCUTANEOUS at 23:47

## 2017-03-17 RX ADMIN — Medication 25 MILLIGRAM(S): at 05:49

## 2017-03-17 RX ADMIN — HEPARIN SODIUM 1650 UNIT(S)/HR: 5000 INJECTION INTRAVENOUS; SUBCUTANEOUS at 05:50

## 2017-03-17 RX ADMIN — Medication 100 MILLIGRAM(S): at 16:48

## 2017-03-17 RX ADMIN — Medication 100 MILLIEQUIVALENT(S): at 16:48

## 2017-03-17 RX ADMIN — INSULIN HUMAN 1 UNIT(S)/HR: 100 INJECTION, SOLUTION SUBCUTANEOUS at 18:13

## 2017-03-17 RX ADMIN — HYDROMORPHONE HYDROCHLORIDE 0.5 MILLIGRAM(S): 2 INJECTION INTRAMUSCULAR; INTRAVENOUS; SUBCUTANEOUS at 22:40

## 2017-03-17 RX ADMIN — FENTANYL CITRATE 25 MICROGRAM(S): 50 INJECTION INTRAVENOUS at 16:45

## 2017-03-17 RX ADMIN — CHLORHEXIDINE GLUCONATE 5 MILLILITER(S): 213 SOLUTION TOPICAL at 14:23

## 2017-03-17 RX ADMIN — Medication 400 MILLIGRAM(S): at 23:47

## 2017-03-17 RX ADMIN — FENTANYL CITRATE 25 MICROGRAM(S): 50 INJECTION INTRAVENOUS at 16:30

## 2017-03-17 RX ADMIN — Medication 100 MILLIEQUIVALENT(S): at 14:45

## 2017-03-17 RX ADMIN — Medication 325 MILLIGRAM(S): at 19:13

## 2017-03-17 RX ADMIN — HYDROMORPHONE HYDROCHLORIDE 0.5 MILLIGRAM(S): 2 INJECTION INTRAMUSCULAR; INTRAVENOUS; SUBCUTANEOUS at 18:55

## 2017-03-17 RX ADMIN — CHLORHEXIDINE GLUCONATE 20 MILLILITER(S): 213 SOLUTION TOPICAL at 05:56

## 2017-03-17 RX ADMIN — Medication 100 MILLIEQUIVALENT(S): at 15:27

## 2017-03-17 RX ADMIN — FAMOTIDINE 20 MILLIGRAM(S): 10 INJECTION INTRAVENOUS at 18:13

## 2017-03-17 RX ADMIN — HYDROMORPHONE HYDROCHLORIDE 0.5 MILLIGRAM(S): 2 INJECTION INTRAMUSCULAR; INTRAVENOUS; SUBCUTANEOUS at 18:35

## 2017-03-17 RX ADMIN — Medication 100 MILLIEQUIVALENT(S): at 13:45

## 2017-03-17 RX ADMIN — HYDROMORPHONE HYDROCHLORIDE 0.5 MILLIGRAM(S): 2 INJECTION INTRAMUSCULAR; INTRAVENOUS; SUBCUTANEOUS at 22:22

## 2017-03-17 RX ADMIN — Medication 100 MILLIEQUIVALENT(S): at 14:00

## 2017-03-17 NOTE — BRIEF OPERATIVE NOTE - PROCEDURE
CABG x 4  03/17/2017  LIMA -->LAD    bilateral greater saphenous veins harvested endoscopically  Active  [id number]

## 2017-03-18 LAB
ALBUMIN SERPL ELPH-MCNC: 3.6 G/DL — SIGNIFICANT CHANGE UP (ref 3.3–5)
ALP SERPL-CCNC: 30 U/L — LOW (ref 40–120)
ALT FLD-CCNC: 24 U/L RC — SIGNIFICANT CHANGE UP (ref 10–45)
ANION GAP SERPL CALC-SCNC: 13 MMOL/L — SIGNIFICANT CHANGE UP (ref 5–17)
APTT BLD: 25.8 SEC — LOW (ref 27.5–37.4)
AST SERPL-CCNC: 33 U/L — SIGNIFICANT CHANGE UP (ref 10–40)
BILIRUB SERPL-MCNC: 0.4 MG/DL — SIGNIFICANT CHANGE UP (ref 0.2–1.2)
BUN SERPL-MCNC: 20 MG/DL — SIGNIFICANT CHANGE UP (ref 7–23)
CALCIUM SERPL-MCNC: 7.9 MG/DL — LOW (ref 8.4–10.5)
CHLORIDE SERPL-SCNC: 102 MMOL/L — SIGNIFICANT CHANGE UP (ref 96–108)
CO2 SERPL-SCNC: 21 MMOL/L — LOW (ref 22–31)
CREAT SERPL-MCNC: 0.62 MG/DL — SIGNIFICANT CHANGE UP (ref 0.5–1.3)
GAS PNL BLDA: SIGNIFICANT CHANGE UP
GLUCOSE SERPL-MCNC: 237 MG/DL — HIGH (ref 70–99)
HCT VFR BLD CALC: 27.7 % — LOW (ref 39–50)
HGB BLD-MCNC: 9.1 G/DL — LOW (ref 13–17)
INR BLD: 1.21 RATIO — HIGH (ref 0.88–1.16)
MCHC RBC-ENTMCNC: 28.2 PG — SIGNIFICANT CHANGE UP (ref 27–34)
MCHC RBC-ENTMCNC: 32.9 GM/DL — SIGNIFICANT CHANGE UP (ref 32–36)
MCV RBC AUTO: 85.7 FL — SIGNIFICANT CHANGE UP (ref 80–100)
PLATELET # BLD AUTO: 140 K/UL — LOW (ref 150–400)
POTASSIUM SERPL-MCNC: 4.2 MMOL/L — SIGNIFICANT CHANGE UP (ref 3.5–5.3)
POTASSIUM SERPL-SCNC: 4.2 MMOL/L — SIGNIFICANT CHANGE UP (ref 3.5–5.3)
PROT SERPL-MCNC: 5.5 G/DL — LOW (ref 6–8.3)
PROTHROM AB SERPL-ACNC: 13.1 SEC — SIGNIFICANT CHANGE UP (ref 10–13.1)
RBC # BLD: 3.23 M/UL — LOW (ref 4.2–5.8)
RBC # FLD: 17.4 % — HIGH (ref 10.3–14.5)
SODIUM SERPL-SCNC: 136 MMOL/L — SIGNIFICANT CHANGE UP (ref 135–145)
WBC # BLD: 18.1 K/UL — HIGH (ref 3.8–10.5)
WBC # FLD AUTO: 18.1 K/UL — HIGH (ref 3.8–10.5)

## 2017-03-18 PROCEDURE — 71010: CPT | Mod: 26

## 2017-03-18 PROCEDURE — 33535 CABG ARTERIAL THREE: CPT

## 2017-03-18 PROCEDURE — 33508 ENDOSCOPIC VEIN HARVEST: CPT

## 2017-03-18 PROCEDURE — 33519 CABG ARTERY-VEIN THREE: CPT

## 2017-03-18 PROCEDURE — 93010 ELECTROCARDIOGRAM REPORT: CPT

## 2017-03-18 RX ORDER — DEXTROSE 50 % IN WATER 50 %
1 SYRINGE (ML) INTRAVENOUS ONCE
Qty: 0 | Refills: 0 | Status: DISCONTINUED | OUTPATIENT
Start: 2017-03-18 | End: 2017-03-22

## 2017-03-18 RX ORDER — FAMOTIDINE 10 MG/ML
20 INJECTION INTRAVENOUS
Qty: 0 | Refills: 0 | Status: DISCONTINUED | OUTPATIENT
Start: 2017-03-18 | End: 2017-03-22

## 2017-03-18 RX ORDER — DEXTROSE 50 % IN WATER 50 %
25 SYRINGE (ML) INTRAVENOUS ONCE
Qty: 0 | Refills: 0 | Status: DISCONTINUED | OUTPATIENT
Start: 2017-03-18 | End: 2017-03-22

## 2017-03-18 RX ORDER — METOCLOPRAMIDE HCL 10 MG
10 TABLET ORAL EVERY 8 HOURS
Qty: 0 | Refills: 0 | Status: COMPLETED | OUTPATIENT
Start: 2017-03-18 | End: 2017-03-19

## 2017-03-18 RX ORDER — HYDROMORPHONE HYDROCHLORIDE 2 MG/ML
0.5 INJECTION INTRAMUSCULAR; INTRAVENOUS; SUBCUTANEOUS ONCE
Qty: 0 | Refills: 0 | Status: DISCONTINUED | OUTPATIENT
Start: 2017-03-18 | End: 2017-03-18

## 2017-03-18 RX ORDER — METOPROLOL TARTRATE 50 MG
25 TABLET ORAL
Qty: 0 | Refills: 0 | Status: DISCONTINUED | OUTPATIENT
Start: 2017-03-18 | End: 2017-03-19

## 2017-03-18 RX ORDER — INSULIN LISPRO 100/ML
8 VIAL (ML) SUBCUTANEOUS
Qty: 0 | Refills: 0 | Status: DISCONTINUED | OUTPATIENT
Start: 2017-03-18 | End: 2017-03-19

## 2017-03-18 RX ORDER — INSULIN GLARGINE 100 [IU]/ML
25 INJECTION, SOLUTION SUBCUTANEOUS AT BEDTIME
Qty: 0 | Refills: 0 | Status: DISCONTINUED | OUTPATIENT
Start: 2017-03-18 | End: 2017-03-19

## 2017-03-18 RX ORDER — HEPARIN SODIUM 5000 [USP'U]/ML
5000 INJECTION INTRAVENOUS; SUBCUTANEOUS EVERY 8 HOURS
Qty: 0 | Refills: 0 | Status: DISCONTINUED | OUTPATIENT
Start: 2017-03-18 | End: 2017-03-20

## 2017-03-18 RX ORDER — DASATINIB 80 MG/1
100 TABLET ORAL DAILY
Qty: 0 | Refills: 0 | Status: DISCONTINUED | OUTPATIENT
Start: 2017-03-18 | End: 2017-03-22

## 2017-03-18 RX ORDER — SODIUM CHLORIDE 9 MG/ML
1000 INJECTION, SOLUTION INTRAVENOUS
Qty: 0 | Refills: 0 | Status: DISCONTINUED | OUTPATIENT
Start: 2017-03-18 | End: 2017-03-22

## 2017-03-18 RX ORDER — DEXTROSE 50 % IN WATER 50 %
12.5 SYRINGE (ML) INTRAVENOUS ONCE
Qty: 0 | Refills: 0 | Status: DISCONTINUED | OUTPATIENT
Start: 2017-03-18 | End: 2017-03-22

## 2017-03-18 RX ORDER — INSULIN LISPRO 100/ML
VIAL (ML) SUBCUTANEOUS
Qty: 0 | Refills: 0 | Status: DISCONTINUED | OUTPATIENT
Start: 2017-03-18 | End: 2017-03-22

## 2017-03-18 RX ORDER — GLUCAGON INJECTION, SOLUTION 0.5 MG/.1ML
1 INJECTION, SOLUTION SUBCUTANEOUS ONCE
Qty: 0 | Refills: 0 | Status: DISCONTINUED | OUTPATIENT
Start: 2017-03-18 | End: 2017-03-22

## 2017-03-18 RX ORDER — ATORVASTATIN CALCIUM 80 MG/1
80 TABLET, FILM COATED ORAL AT BEDTIME
Qty: 0 | Refills: 0 | Status: DISCONTINUED | OUTPATIENT
Start: 2017-03-18 | End: 2017-03-22

## 2017-03-18 RX ADMIN — FAMOTIDINE 20 MILLIGRAM(S): 10 INJECTION INTRAVENOUS at 18:14

## 2017-03-18 RX ADMIN — DASATINIB 100 MILLIGRAM(S): 80 TABLET ORAL at 13:17

## 2017-03-18 RX ADMIN — Medication 100 MILLIGRAM(S): at 13:18

## 2017-03-18 RX ADMIN — Medication 10 MILLIGRAM(S): at 22:22

## 2017-03-18 RX ADMIN — Medication 10 MILLIGRAM(S): at 05:10

## 2017-03-18 RX ADMIN — Medication 100 MILLIGRAM(S): at 22:23

## 2017-03-18 RX ADMIN — HEPARIN SODIUM 5000 UNIT(S): 5000 INJECTION INTRAVENOUS; SUBCUTANEOUS at 22:23

## 2017-03-18 RX ADMIN — Medication 100 MILLIGRAM(S): at 00:40

## 2017-03-18 RX ADMIN — Medication 100 MILLIGRAM(S): at 09:21

## 2017-03-18 RX ADMIN — Medication 3: at 13:20

## 2017-03-18 RX ADMIN — Medication 25 MILLIGRAM(S): at 18:14

## 2017-03-18 RX ADMIN — Medication 100 MILLIGRAM(S): at 18:14

## 2017-03-18 RX ADMIN — HEPARIN SODIUM 5000 UNIT(S): 5000 INJECTION INTRAVENOUS; SUBCUTANEOUS at 13:18

## 2017-03-18 RX ADMIN — Medication 8 UNIT(S): at 13:21

## 2017-03-18 RX ADMIN — Medication 2: at 22:22

## 2017-03-18 RX ADMIN — ATORVASTATIN CALCIUM 80 MILLIGRAM(S): 80 TABLET, FILM COATED ORAL at 22:23

## 2017-03-18 RX ADMIN — FAMOTIDINE 20 MILLIGRAM(S): 10 INJECTION INTRAVENOUS at 05:10

## 2017-03-18 RX ADMIN — Medication 81 MILLIGRAM(S): at 12:37

## 2017-03-18 RX ADMIN — INSULIN GLARGINE 25 UNIT(S): 100 INJECTION, SOLUTION SUBCUTANEOUS at 22:23

## 2017-03-18 RX ADMIN — HYDROMORPHONE HYDROCHLORIDE 0.5 MILLIGRAM(S): 2 INJECTION INTRAMUSCULAR; INTRAVENOUS; SUBCUTANEOUS at 14:00

## 2017-03-18 RX ADMIN — HYDROMORPHONE HYDROCHLORIDE 0.5 MILLIGRAM(S): 2 INJECTION INTRAMUSCULAR; INTRAVENOUS; SUBCUTANEOUS at 05:28

## 2017-03-18 RX ADMIN — HYDROMORPHONE HYDROCHLORIDE 0.5 MILLIGRAM(S): 2 INJECTION INTRAMUSCULAR; INTRAVENOUS; SUBCUTANEOUS at 14:15

## 2017-03-18 RX ADMIN — Medication 100 MILLIGRAM(S): at 05:10

## 2017-03-18 RX ADMIN — HYDROMORPHONE HYDROCHLORIDE 0.5 MILLIGRAM(S): 2 INJECTION INTRAMUSCULAR; INTRAVENOUS; SUBCUTANEOUS at 05:43

## 2017-03-18 RX ADMIN — Medication 25 MILLIGRAM(S): at 10:48

## 2017-03-18 RX ADMIN — Medication 10 MILLIGRAM(S): at 13:18

## 2017-03-18 RX ADMIN — Medication 1: at 18:56

## 2017-03-18 RX ADMIN — Medication 8 UNIT(S): at 18:56

## 2017-03-18 NOTE — PHYSICAL THERAPY INITIAL EVALUATION ADULT - ADDITIONAL COMMENTS
per pt, lives with family in pvt house with 4 steps to enter with rail; bed/bath on 2nd floor; was independent without device prior to admission; still working and driving; wife will assist as needed at home.

## 2017-03-18 NOTE — PHYSICAL THERAPY INITIAL EVALUATION ADULT - PERTINENT HX OF CURRENT PROBLEM, REHAB EVAL
Pt is a 53 yo M with DM2, CML, gout transferred from [facility] with radiating chest pain and dypnea now s/p above surgery.

## 2017-03-19 LAB
ANION GAP SERPL CALC-SCNC: 11 MMOL/L — SIGNIFICANT CHANGE UP (ref 5–17)
BASOPHILS # BLD AUTO: 0 K/UL — SIGNIFICANT CHANGE UP (ref 0–0.2)
BASOPHILS NFR BLD AUTO: 0.1 % — SIGNIFICANT CHANGE UP (ref 0–2)
BUN SERPL-MCNC: 24 MG/DL — HIGH (ref 7–23)
CALCIUM SERPL-MCNC: 8.2 MG/DL — LOW (ref 8.4–10.5)
CHLORIDE SERPL-SCNC: 101 MMOL/L — SIGNIFICANT CHANGE UP (ref 96–108)
CO2 SERPL-SCNC: 24 MMOL/L — SIGNIFICANT CHANGE UP (ref 22–31)
CREAT SERPL-MCNC: 0.69 MG/DL — SIGNIFICANT CHANGE UP (ref 0.5–1.3)
EOSINOPHIL # BLD AUTO: 0 K/UL — SIGNIFICANT CHANGE UP (ref 0–0.5)
EOSINOPHIL NFR BLD AUTO: 0.1 % — SIGNIFICANT CHANGE UP (ref 0–6)
GLUCOSE SERPL-MCNC: 200 MG/DL — HIGH (ref 70–99)
HCT VFR BLD CALC: 26.2 % — LOW (ref 39–50)
HGB BLD-MCNC: 8.8 G/DL — LOW (ref 13–17)
LYMPHOCYTES # BLD AUTO: 14.3 % — SIGNIFICANT CHANGE UP (ref 13–44)
LYMPHOCYTES # BLD AUTO: 2.1 K/UL — SIGNIFICANT CHANGE UP (ref 1–3.3)
MCHC RBC-ENTMCNC: 29.3 PG — SIGNIFICANT CHANGE UP (ref 27–34)
MCHC RBC-ENTMCNC: 33.5 GM/DL — SIGNIFICANT CHANGE UP (ref 32–36)
MCV RBC AUTO: 87.5 FL — SIGNIFICANT CHANGE UP (ref 80–100)
MONOCYTES # BLD AUTO: 0.9 K/UL — SIGNIFICANT CHANGE UP (ref 0–0.9)
MONOCYTES NFR BLD AUTO: 6.3 % — SIGNIFICANT CHANGE UP (ref 2–14)
NEUTROPHILS # BLD AUTO: 11.4 K/UL — HIGH (ref 1.8–7.4)
NEUTROPHILS NFR BLD AUTO: 79.2 % — HIGH (ref 43–77)
PLATELET # BLD AUTO: 135 K/UL — LOW (ref 150–400)
POTASSIUM SERPL-MCNC: 4.7 MMOL/L — SIGNIFICANT CHANGE UP (ref 3.5–5.3)
POTASSIUM SERPL-SCNC: 4.7 MMOL/L — SIGNIFICANT CHANGE UP (ref 3.5–5.3)
RBC # BLD: 2.99 M/UL — LOW (ref 4.2–5.8)
RBC # FLD: 18.5 % — HIGH (ref 10.3–14.5)
SODIUM SERPL-SCNC: 136 MMOL/L — SIGNIFICANT CHANGE UP (ref 135–145)
WBC # BLD: 14.4 K/UL — HIGH (ref 3.8–10.5)
WBC # FLD AUTO: 14.4 K/UL — HIGH (ref 3.8–10.5)

## 2017-03-19 PROCEDURE — 71010: CPT | Mod: 26,76

## 2017-03-19 RX ORDER — INSULIN GLARGINE 100 [IU]/ML
40 INJECTION, SOLUTION SUBCUTANEOUS AT BEDTIME
Qty: 0 | Refills: 0 | Status: DISCONTINUED | OUTPATIENT
Start: 2017-03-19 | End: 2017-03-19

## 2017-03-19 RX ORDER — HYDROMORPHONE HYDROCHLORIDE 2 MG/ML
0.5 INJECTION INTRAMUSCULAR; INTRAVENOUS; SUBCUTANEOUS ONCE
Qty: 0 | Refills: 0 | Status: DISCONTINUED | OUTPATIENT
Start: 2017-03-19 | End: 2017-03-19

## 2017-03-19 RX ORDER — OXYCODONE HYDROCHLORIDE 5 MG/1
5 TABLET ORAL ONCE
Qty: 0 | Refills: 0 | Status: DISCONTINUED | OUTPATIENT
Start: 2017-03-19 | End: 2017-03-19

## 2017-03-19 RX ORDER — INSULIN LISPRO 100/ML
12 VIAL (ML) SUBCUTANEOUS
Qty: 0 | Refills: 0 | Status: DISCONTINUED | OUTPATIENT
Start: 2017-03-19 | End: 2017-03-20

## 2017-03-19 RX ORDER — METOPROLOL TARTRATE 50 MG
25 TABLET ORAL THREE TIMES A DAY
Qty: 0 | Refills: 0 | Status: DISCONTINUED | OUTPATIENT
Start: 2017-03-19 | End: 2017-03-22

## 2017-03-19 RX ORDER — INSULIN LISPRO 100/ML
14 VIAL (ML) SUBCUTANEOUS
Qty: 0 | Refills: 0 | Status: DISCONTINUED | OUTPATIENT
Start: 2017-03-19 | End: 2017-03-19

## 2017-03-19 RX ORDER — INSULIN GLARGINE 100 [IU]/ML
34 INJECTION, SOLUTION SUBCUTANEOUS AT BEDTIME
Qty: 0 | Refills: 0 | Status: DISCONTINUED | OUTPATIENT
Start: 2017-03-19 | End: 2017-03-20

## 2017-03-19 RX ADMIN — Medication 3: at 12:52

## 2017-03-19 RX ADMIN — Medication 3: at 23:05

## 2017-03-19 RX ADMIN — Medication 8 UNIT(S): at 08:18

## 2017-03-19 RX ADMIN — Medication 25 MILLIGRAM(S): at 05:37

## 2017-03-19 RX ADMIN — HYDROMORPHONE HYDROCHLORIDE 0.5 MILLIGRAM(S): 2 INJECTION INTRAMUSCULAR; INTRAVENOUS; SUBCUTANEOUS at 07:03

## 2017-03-19 RX ADMIN — Medication 100 MILLIGRAM(S): at 05:37

## 2017-03-19 RX ADMIN — HEPARIN SODIUM 5000 UNIT(S): 5000 INJECTION INTRAVENOUS; SUBCUTANEOUS at 14:42

## 2017-03-19 RX ADMIN — Medication 25 MILLIGRAM(S): at 22:38

## 2017-03-19 RX ADMIN — INSULIN GLARGINE 34 UNIT(S): 100 INJECTION, SOLUTION SUBCUTANEOUS at 23:13

## 2017-03-19 RX ADMIN — Medication 10 MILLIGRAM(S): at 22:41

## 2017-03-19 RX ADMIN — Medication 10 MILLIGRAM(S): at 05:37

## 2017-03-19 RX ADMIN — FAMOTIDINE 20 MILLIGRAM(S): 10 INJECTION INTRAVENOUS at 17:42

## 2017-03-19 RX ADMIN — Medication 100 MILLIGRAM(S): at 22:38

## 2017-03-19 RX ADMIN — HEPARIN SODIUM 5000 UNIT(S): 5000 INJECTION INTRAVENOUS; SUBCUTANEOUS at 22:38

## 2017-03-19 RX ADMIN — Medication 8 UNIT(S): at 12:52

## 2017-03-19 RX ADMIN — Medication 2: at 08:17

## 2017-03-19 RX ADMIN — Medication 25 MILLIGRAM(S): at 14:42

## 2017-03-19 RX ADMIN — DASATINIB 100 MILLIGRAM(S): 80 TABLET ORAL at 11:40

## 2017-03-19 RX ADMIN — FAMOTIDINE 20 MILLIGRAM(S): 10 INJECTION INTRAVENOUS at 05:37

## 2017-03-19 RX ADMIN — HEPARIN SODIUM 5000 UNIT(S): 5000 INJECTION INTRAVENOUS; SUBCUTANEOUS at 05:37

## 2017-03-19 RX ADMIN — Medication 100 MILLIGRAM(S): at 01:22

## 2017-03-19 RX ADMIN — OXYCODONE HYDROCHLORIDE 5 MILLIGRAM(S): 5 TABLET ORAL at 17:42

## 2017-03-19 RX ADMIN — Medication 81 MILLIGRAM(S): at 11:40

## 2017-03-19 RX ADMIN — Medication 3: at 17:41

## 2017-03-19 RX ADMIN — HYDROMORPHONE HYDROCHLORIDE 0.5 MILLIGRAM(S): 2 INJECTION INTRAMUSCULAR; INTRAVENOUS; SUBCUTANEOUS at 07:18

## 2017-03-19 RX ADMIN — ATORVASTATIN CALCIUM 80 MILLIGRAM(S): 80 TABLET, FILM COATED ORAL at 22:38

## 2017-03-19 RX ADMIN — OXYCODONE HYDROCHLORIDE 5 MILLIGRAM(S): 5 TABLET ORAL at 18:18

## 2017-03-19 RX ADMIN — Medication 10 MILLIGRAM(S): at 14:42

## 2017-03-20 LAB
ANION GAP SERPL CALC-SCNC: 10 MMOL/L — SIGNIFICANT CHANGE UP (ref 5–17)
BUN SERPL-MCNC: 18 MG/DL — SIGNIFICANT CHANGE UP (ref 7–23)
CALCIUM SERPL-MCNC: 8.1 MG/DL — LOW (ref 8.4–10.5)
CHLORIDE SERPL-SCNC: 100 MMOL/L — SIGNIFICANT CHANGE UP (ref 96–108)
CO2 SERPL-SCNC: 26 MMOL/L — SIGNIFICANT CHANGE UP (ref 22–31)
CREAT SERPL-MCNC: 0.66 MG/DL — SIGNIFICANT CHANGE UP (ref 0.5–1.3)
GLUCOSE SERPL-MCNC: 178 MG/DL — HIGH (ref 70–99)
HCT VFR BLD CALC: 24.8 % — LOW (ref 39–50)
HGB BLD-MCNC: 8.2 G/DL — LOW (ref 13–17)
MCHC RBC-ENTMCNC: 29.1 PG — SIGNIFICANT CHANGE UP (ref 27–34)
MCHC RBC-ENTMCNC: 33.2 GM/DL — SIGNIFICANT CHANGE UP (ref 32–36)
MCV RBC AUTO: 87.6 FL — SIGNIFICANT CHANGE UP (ref 80–100)
PLATELET # BLD AUTO: 121 K/UL — LOW (ref 150–400)
POTASSIUM SERPL-MCNC: 4.4 MMOL/L — SIGNIFICANT CHANGE UP (ref 3.5–5.3)
POTASSIUM SERPL-SCNC: 4.4 MMOL/L — SIGNIFICANT CHANGE UP (ref 3.5–5.3)
RBC # BLD: 2.83 M/UL — LOW (ref 4.2–5.8)
RBC # FLD: 18.4 % — HIGH (ref 10.3–14.5)
SODIUM SERPL-SCNC: 136 MMOL/L — SIGNIFICANT CHANGE UP (ref 135–145)
WBC # BLD: 7.2 K/UL — SIGNIFICANT CHANGE UP (ref 3.8–10.5)
WBC # FLD AUTO: 7.2 K/UL — SIGNIFICANT CHANGE UP (ref 3.8–10.5)

## 2017-03-20 PROCEDURE — 71010: CPT | Mod: 26

## 2017-03-20 PROCEDURE — 71010: CPT | Mod: 26,77

## 2017-03-20 RX ORDER — FONDAPARINUX SODIUM 2.5 MG/.5ML
2.5 INJECTION, SOLUTION SUBCUTANEOUS DAILY
Qty: 0 | Refills: 0 | Status: DISCONTINUED | OUTPATIENT
Start: 2017-03-20 | End: 2017-03-22

## 2017-03-20 RX ORDER — INSULIN GLARGINE 100 [IU]/ML
44 INJECTION, SOLUTION SUBCUTANEOUS AT BEDTIME
Qty: 0 | Refills: 0 | Status: DISCONTINUED | OUTPATIENT
Start: 2017-03-20 | End: 2017-03-22

## 2017-03-20 RX ORDER — INSULIN LISPRO 100/ML
17 VIAL (ML) SUBCUTANEOUS
Qty: 0 | Refills: 0 | Status: DISCONTINUED | OUTPATIENT
Start: 2017-03-20 | End: 2017-03-21

## 2017-03-20 RX ADMIN — Medication 3: at 11:52

## 2017-03-20 RX ADMIN — Medication 12 UNIT(S): at 17:26

## 2017-03-20 RX ADMIN — HEPARIN SODIUM 5000 UNIT(S): 5000 INJECTION INTRAVENOUS; SUBCUTANEOUS at 06:10

## 2017-03-20 RX ADMIN — FAMOTIDINE 20 MILLIGRAM(S): 10 INJECTION INTRAVENOUS at 17:26

## 2017-03-20 RX ADMIN — Medication 100 MILLIGRAM(S): at 06:09

## 2017-03-20 RX ADMIN — FONDAPARINUX SODIUM 2.5 MILLIGRAM(S): 2.5 INJECTION, SOLUTION SUBCUTANEOUS at 13:20

## 2017-03-20 RX ADMIN — Medication 25 MILLIGRAM(S): at 13:19

## 2017-03-20 RX ADMIN — Medication 1: at 22:04

## 2017-03-20 RX ADMIN — Medication 1: at 07:53

## 2017-03-20 RX ADMIN — Medication 12 UNIT(S): at 07:53

## 2017-03-20 RX ADMIN — Medication 2: at 17:26

## 2017-03-20 RX ADMIN — FAMOTIDINE 20 MILLIGRAM(S): 10 INJECTION INTRAVENOUS at 06:09

## 2017-03-20 RX ADMIN — Medication 25 MILLIGRAM(S): at 06:09

## 2017-03-20 RX ADMIN — DASATINIB 100 MILLIGRAM(S): 80 TABLET ORAL at 13:29

## 2017-03-20 RX ADMIN — Medication 100 MILLIGRAM(S): at 21:12

## 2017-03-20 RX ADMIN — Medication 100 MILLIGRAM(S): at 11:51

## 2017-03-20 RX ADMIN — Medication 12 UNIT(S): at 11:52

## 2017-03-20 RX ADMIN — ATORVASTATIN CALCIUM 80 MILLIGRAM(S): 80 TABLET, FILM COATED ORAL at 21:12

## 2017-03-20 RX ADMIN — INSULIN GLARGINE 44 UNIT(S): 100 INJECTION, SOLUTION SUBCUTANEOUS at 22:04

## 2017-03-20 RX ADMIN — Medication 81 MILLIGRAM(S): at 11:02

## 2017-03-20 RX ADMIN — Medication 25 MILLIGRAM(S): at 21:12

## 2017-03-20 NOTE — DIETITIAN INITIAL EVALUATION ADULT. - ENERGY NEEDS
ht: 69 inches (per pt). wt: 180.1 pounds (standing, admit). BMI: 26.6. UBW: 187 pounds. IBW: 160 pounds +/- 10%. %IBW: 113%.  Other pertinent objective information: 52 year old male pt with PMH of DM2, HTN, HLD, chronic myelocytic leukemia, gout. Pt p/w squeezing and burning chest pain 3/15, ACS protocol initiated, ECHO completed: EF >55%, normal. CTA Chest: negative for PE, CXR: negative. Pt s/p cardiac cath revealed multivessel disease, s/p CABG x 4 3/17, POD#3. +1 generalized edema noted, no pressure injuries.

## 2017-03-20 NOTE — DIETITIAN INITIAL EVALUATION ADULT. - ORAL INTAKE PTA
Pt reports excellent po intake PTA. Would eat 3 meals/day, consisting primarily of "meat and potatoes" (eggs and potatoes at breakfast, chicken, fish, beef at lunch and dinner with side of starch), and states he dislikes all vegetables. Primary meal prep done by pt and pt's wife. DM meds PTA: Januvia., Glyburide-Metformin. No vitamin/mineral supplements PTA./good

## 2017-03-20 NOTE — DIETITIAN INITIAL EVALUATION ADULT. - NS AS NUTRI INTERV MEALS SNACK
Fat - modified diet/Carbohydrate - modified diet/1) continue consistenct CHO with no snacks, DASH/TLC diet for blood glucose control and to promote heart health/Mineral - modified diet

## 2017-03-20 NOTE — DIETITIAN INITIAL EVALUATION ADULT. - OTHER INFO
Pt seen for consult HgA1c >7%. Pt alert and verbal, reports good PO intake and appetite at this time. Denies food allergies but does not eat pork for Mosque reasons. Denies significant wt changes as of recent, states he may have lost a few pounds during hospital stay. Pt admits to general diet non-compliance, unaware of appropriate carbohydrate portions and has avoided vegetables for most of his life. Upon interview pt unwilling to include any vegetable in his diet. States he rarely checks his blood glucose with meter at home, may check it once every 2 weeks. he is unsure of usual fingerstick readings and of appropriate BG ranges. Denies N/V/diarrhea however admits to constipation; unsure of last BM. On bowel regimen.

## 2017-03-21 ENCOUNTER — TRANSCRIPTION ENCOUNTER (OUTPATIENT)
Age: 52
End: 2017-03-21

## 2017-03-21 LAB
ANION GAP SERPL CALC-SCNC: 14 MMOL/L — SIGNIFICANT CHANGE UP (ref 5–17)
BUN SERPL-MCNC: 22 MG/DL — SIGNIFICANT CHANGE UP (ref 7–23)
CALCIUM SERPL-MCNC: 8.7 MG/DL — SIGNIFICANT CHANGE UP (ref 8.4–10.5)
CHLORIDE SERPL-SCNC: 101 MMOL/L — SIGNIFICANT CHANGE UP (ref 96–108)
CO2 SERPL-SCNC: 24 MMOL/L — SIGNIFICANT CHANGE UP (ref 22–31)
CREAT SERPL-MCNC: 0.66 MG/DL — SIGNIFICANT CHANGE UP (ref 0.5–1.3)
GLUCOSE SERPL-MCNC: 90 MG/DL — SIGNIFICANT CHANGE UP (ref 70–99)
HCT VFR BLD CALC: 26 % — LOW (ref 39–50)
HGB BLD-MCNC: 8.5 G/DL — LOW (ref 13–17)
MCHC RBC-ENTMCNC: 29 PG — SIGNIFICANT CHANGE UP (ref 27–34)
MCHC RBC-ENTMCNC: 32.7 GM/DL — SIGNIFICANT CHANGE UP (ref 32–36)
MCV RBC AUTO: 88.5 FL — SIGNIFICANT CHANGE UP (ref 80–100)
PF4 HEPARIN CMPLX AB SER-ACNC: NEGATIVE — SIGNIFICANT CHANGE UP
PF4 HEPARIN CMPLX AB SERPL QL IA: 0.15 ABS — SIGNIFICANT CHANGE UP
PLATELET # BLD AUTO: 146 K/UL — LOW (ref 150–400)
POTASSIUM SERPL-MCNC: 4 MMOL/L — SIGNIFICANT CHANGE UP (ref 3.5–5.3)
POTASSIUM SERPL-SCNC: 4 MMOL/L — SIGNIFICANT CHANGE UP (ref 3.5–5.3)
RBC # BLD: 2.93 M/UL — LOW (ref 4.2–5.8)
RBC # FLD: 18.5 % — HIGH (ref 10.3–14.5)
SODIUM SERPL-SCNC: 139 MMOL/L — SIGNIFICANT CHANGE UP (ref 135–145)
WBC # BLD: 7.1 K/UL — SIGNIFICANT CHANGE UP (ref 3.8–10.5)
WBC # FLD AUTO: 7.1 K/UL — SIGNIFICANT CHANGE UP (ref 3.8–10.5)

## 2017-03-21 PROCEDURE — 93010 ELECTROCARDIOGRAM REPORT: CPT

## 2017-03-21 RX ORDER — INSULIN LISPRO 100/ML
15 VIAL (ML) SUBCUTANEOUS
Qty: 0 | Refills: 0 | Status: DISCONTINUED | OUTPATIENT
Start: 2017-03-21 | End: 2017-03-22

## 2017-03-21 RX ADMIN — INSULIN GLARGINE 44 UNIT(S): 100 INJECTION, SOLUTION SUBCUTANEOUS at 22:25

## 2017-03-21 RX ADMIN — Medication 25 MILLIGRAM(S): at 05:43

## 2017-03-21 RX ADMIN — Medication 25 MILLIGRAM(S): at 14:42

## 2017-03-21 RX ADMIN — FAMOTIDINE 20 MILLIGRAM(S): 10 INJECTION INTRAVENOUS at 05:43

## 2017-03-21 RX ADMIN — FAMOTIDINE 20 MILLIGRAM(S): 10 INJECTION INTRAVENOUS at 17:06

## 2017-03-21 RX ADMIN — Medication 81 MILLIGRAM(S): at 12:05

## 2017-03-21 RX ADMIN — Medication 17 UNIT(S): at 12:07

## 2017-03-21 RX ADMIN — Medication 25 MILLIGRAM(S): at 22:25

## 2017-03-21 RX ADMIN — FONDAPARINUX SODIUM 2.5 MILLIGRAM(S): 2.5 INJECTION, SOLUTION SUBCUTANEOUS at 12:06

## 2017-03-21 RX ADMIN — Medication 100 MILLIGRAM(S): at 12:05

## 2017-03-21 RX ADMIN — Medication 1: at 12:07

## 2017-03-21 RX ADMIN — Medication 2: at 22:33

## 2017-03-21 RX ADMIN — Medication 17 UNIT(S): at 07:42

## 2017-03-21 RX ADMIN — ATORVASTATIN CALCIUM 80 MILLIGRAM(S): 80 TABLET, FILM COATED ORAL at 22:25

## 2017-03-21 RX ADMIN — DASATINIB 100 MILLIGRAM(S): 80 TABLET ORAL at 12:19

## 2017-03-21 RX ADMIN — Medication 17 UNIT(S): at 17:06

## 2017-03-21 RX ADMIN — Medication 100 MILLIGRAM(S): at 22:25

## 2017-03-21 RX ADMIN — Medication 100 MILLIGRAM(S): at 05:43

## 2017-03-21 NOTE — DISCHARGE NOTE ADULT - CARE PROVIDERS DIRECT ADDRESSES
,[email or web address],DirectAddress_Unknown,DirectAddress_Unknown,[email or web address],DirectAddress_Unknown,DirectAddress_Unknown,DirectAddress_Unknown,[email or web address]

## 2017-03-21 NOTE — DISCHARGE NOTE ADULT - MEDICATION SUMMARY - MEDICATIONS TO TAKE
I will START or STAY ON the medications listed below when I get home from the hospital:    needles , lantus solostar  -- 30 subcutaneous  -- Indication: For supply    needles, humalog quickpen  -- subcutaneous 3 times a day  -- Indication: For supply    acetaminophen-oxyCODONE 325 mg-5 mg oral tablet  -- 1 tab(s) by mouth every 6 hours, As needed, Mild Pain MDD:4  -- Indication: For pain    aspirin 81 mg oral delayed release tablet  -- 1 tab(s) by mouth once a day  -- Indication: For antiplatelet    insulin lispro 100 units/mL subcutaneous solution  -- 15 unit(s) subcutaneous 3 times a day (before meals)  -- Indication: For diabetes    HumaLOG KwikPen 100 units/mL subcutaneous solution  -- 15 unit(s) subcutaneous 3 times a day (before meals)  -- Do not drink alcoholic beverages when taking this medication.  It is very important that you take or use this exactly as directed.  Do not skip doses or discontinue unless directed by your doctor.  Keep in refrigerator.  Do not freeze.    -- Indication: For diabetes    Lantus Solostar Pen 100 units/mL subcutaneous solution  -- 44 unit(s) subcutaneous once a day (at bedtime)  -- Do not drink alcoholic beverages when taking this medication.  It is very important that you take or use this exactly as directed.  Do not skip doses or discontinue unless directed by your doctor.  Keep in refrigerator.  Do not freeze.    -- Indication: For diabetes    atorvastatin 80 mg oral tablet  -- 1 tab(s) by mouth once a day (at bedtime)  -- Indication: For Cholesterol    dasatinib 100 mg oral tablet  -- 1 tab(s) by mouth once a day  -- Indication: For CML (chronic myelocytic leukemia)    metoprolol tartrate 25 mg oral tablet  -- 1 tab(s) by mouth 3 times a day  -- Indication: For Heart rate    famotidine 20 mg oral tablet  -- 1 tab(s) by mouth once a day  -- Indication: For Gi    docusate sodium 100 mg oral capsule  -- 1 cap(s) by mouth 3 times a day  -- Indication: For laxative I will START or STAY ON the medications listed below when I get home from the hospital:    needles , lantus solostar  -- 30 subcutaneous  -- Indication: For supply    needles, humalog quickpen  -- subcutaneous 3 times a day  -- Indication: For supply    acetaminophen-oxyCODONE 325 mg-5 mg oral tablet  -- 1 tab(s) by mouth every 6 hours, As needed, Mild Pain MDD:4  -- Indication: For pain    aspirin 81 mg oral delayed release tablet  -- 1 tab(s) by mouth once a day  -- Indication: For antiplatelet    HumaLOG KwikPen 100 units/mL subcutaneous solution  -- 15 unit(s) subcutaneous 3 times a day (before meals)  -- Do not drink alcoholic beverages when taking this medication.  It is very important that you take or use this exactly as directed.  Do not skip doses or discontinue unless directed by your doctor.  Keep in refrigerator.  Do not freeze.    -- Indication: For diabetes    Lantus Solostar Pen 100 units/mL subcutaneous solution  -- 44 unit(s) subcutaneous once a day (at bedtime)  -- Do not drink alcoholic beverages when taking this medication.  It is very important that you take or use this exactly as directed.  Do not skip doses or discontinue unless directed by your doctor.  Keep in refrigerator.  Do not freeze.    -- Indication: For diabetes    atorvastatin 80 mg oral tablet  -- 1 tab(s) by mouth once a day (at bedtime)  -- Indication: For Cholesterol    dasatinib 100 mg oral tablet  -- 1 tab(s) by mouth once a day  -- Indication: For CML (chronic myelocytic leukemia)    metoprolol tartrate 25 mg oral tablet  -- 1 tab(s) by mouth 3 times a day  -- Indication: For Heart rate    famotidine 20 mg oral tablet  -- 1 tab(s) by mouth once a day  -- Indication: For Gi    docusate sodium 100 mg oral capsule  -- 1 cap(s) by mouth 3 times a day  -- Indication: For laxative I will START or STAY ON the medications listed below when I get home from the hospital:    BD pen needles   -- 4 application needles subcutaneous 4 times a day 1 month supply  -- Indication: For Diabetes     aspirin 81 mg oral delayed release tablet  -- 1 tab(s) by mouth once a day  -- Indication: For Blood thinner     acetaminophen-oxyCODONE 325 mg-5 mg oral tablet  -- 1 tab(s) by mouth every 6 hours, As needed, Mild Pain MDD:4  -- Indication: For Pain     Levemir FlexPen 100 units/mL subcutaneous solution  -- 44 unit(s) subcutaneous once a day (at bedtime)  -- Check with your doctor before becoming pregnant.  Do not drink alcoholic beverages when taking this medication.  Keep in refrigerator.  Do not freeze.    -- Indication: For Diabetes    NovoLOG FlexPen 100 units/mL subcutaneous solution  -- 15 unit(s) subcutaneous 3 times a day (before meals)  -- Do not drink alcoholic beverages when taking this medication.  Keep in refrigerator.  Do not freeze.  Obtain medical advice before taking any non-prescription drugs as some may affect the action of this medication.    -- Indication: For Diabetes     atorvastatin 80 mg oral tablet  -- 1 tab(s) by mouth once a day (at bedtime)  -- Indication: For Cholesterol     dasatinib 100 mg oral tablet  -- 1 tab(s) by mouth once a day  -- Indication: For CML (chronic myelocytic leukemia)    metoprolol tartrate 25 mg oral tablet  -- 1 tab(s) by mouth 3 times a day  -- Indication: For Blood pressure and heart rate control     famotidine 20 mg oral tablet  -- 1 tab(s) by mouth once a day  -- Indication: For Acid Reflux     docusate sodium 100 mg oral capsule  -- 1 cap(s) by mouth 3 times a day  -- Indication: For Stool Softner

## 2017-03-21 NOTE — DISCHARGE NOTE ADULT - CARE PROVIDER_API CALL
Nicko Serrano), Thoracic and Cardiac Surgery  [address] [place] [address]  Phone: [phone]  Fax: [phone]    Fabian Quevedo), EndocrinologyMetabDiabetes; Internal Medicine  [address] [place] [address]  Phone: [phone]  Fax: [phone]    Lorne Sparks), Hematology; Medical Oncology  [address] [place] [address]  Phone: [phone]  Fax: [phone] Nicko Serrano), Thoracic and Cardiac Surgery  [address] [place] [address]  Phone: [phone]  Fax: [phone]    Fabian Quevedo), EndocrinologyMetabDiabetes; Internal Medicine  [address] [place] [address]  Phone: [phone]  Fax: [phone]    Lorne Sparks), Hematology; Medical Oncology  [address] [place] [address]  Phone: [phone]  Fax: [phone]    Mario Rudd (DO), Cardiology; Interventional Cardiology  [address] [place] [address]  Phone: [phone]  Fax: [phone]

## 2017-03-21 NOTE — DISCHARGE NOTE ADULT - PROVIDER TOKENS
TOKEN:'[id number]:'[id number]:'[id number]' TOKEN:'[id number]:'[id number]:'[id number]',TOKEN:'[id number]'

## 2017-03-21 NOTE — DISCHARGE NOTE ADULT - MEDICATION SUMMARY - MEDICATIONS TO CHANGE
I will SWITCH the dose or number of times a day I take the medications listed below when I get home from the hospital:    metoprolol succinate 25 mg oral tablet, extended release  -- 0.5 tab(s) by mouth 2 times a day

## 2017-03-21 NOTE — DISCHARGE NOTE ADULT - ADDITIONAL INSTRUCTIONS
YOU WILL NEED TO FOLLOW UP WITH DR MANJARREZ IN 1 WEEK, [phone]    YOU HAVE AN APPOINTMENT WITH DR REYNA ON APRIL 3RD AT 10:30a    CALL DR TRUONG (HEMATOLOGY ) AND SEE HIM IN 1-2 WEEKS (CONTACT INFORMATION ATTACHED) [phone] YOU WILL NEED TO FOLLOW UP WITH DR MANJARREZ IN 1 WEEK, [phone]    YOU HAVE AN APPOINTMENT WITH DR REYNA ON APRIL 3RD AT 10:30a    CALL DR TRUONG (HEMATOLOGY ) AND SEE HIM IN 1-2 WEEKS (CONTACT INFORMATION ATTACHED) [phone]    CALL DR MOREJON AND SEE HIM NEXT WEEK

## 2017-03-21 NOTE — DISCHARGE NOTE ADULT - NS AS ACTIVITY OBS
Showering allowed/No Heavy lifting/straining/Sex allowed/Stairs allowed/Do not drive or operate machinery/Bathing allowed/Walking-Indoors allowed

## 2017-03-21 NOTE — DISCHARGE NOTE ADULT - MEDICATION SUMMARY - MEDICATIONS TO STOP TAKING
I will STOP taking the medications listed below when I get home from the hospital:    ramipril 10 mg oral capsule  -- 1 cap(s) by mouth once a day (home)    allopurinol 300 mg oral tablet  -- 1 tab(s) by mouth once a day  (home and hospital)    heparin 100 units/mL-D5% intravenous solution  --  intravenous   (hospital)    Plavix 75 mg oral tablet  -- 1 tab(s) by mouth once a day  (hospital)    lisinopril 40 mg oral tablet  -- 1 tab(s) by mouth once a day  (hospital)

## 2017-03-21 NOTE — DISCHARGE NOTE ADULT - PATIENT PORTAL LINK FT
“You can access the [facility] Patient Portal, offered by [facility], by registering with the following website: [email or web address]”

## 2017-03-21 NOTE — DISCHARGE NOTE ADULT - CARE PLAN
Principal Discharge DX:	S/P CABG x 4  Goal:	recovery  Instructions for follow-up, activity and diet:	Diabetic, Regular no added salt diet  Shower daily  Daily weights, call for a gain > 3 lbs

## 2017-03-22 VITALS
RESPIRATION RATE: 18 BRPM | SYSTOLIC BLOOD PRESSURE: 119 MMHG | OXYGEN SATURATION: 96 % | TEMPERATURE: 98 F | HEART RATE: 84 BPM | DIASTOLIC BLOOD PRESSURE: 84 MMHG

## 2017-03-22 DIAGNOSIS — E11.65 TYPE 2 DIABETES MELLITUS WITH HYPERGLYCEMIA: ICD-10-CM

## 2017-03-22 DIAGNOSIS — C92.10 CHRONIC MYELOID LEUKEMIA, BCR/ABL-POSITIVE, NOT HAVING ACHIEVED REMISSION: ICD-10-CM

## 2017-03-22 DIAGNOSIS — D64.9 ANEMIA, UNSPECIFIED: ICD-10-CM

## 2017-03-22 DIAGNOSIS — E78.5 HYPERLIPIDEMIA, UNSPECIFIED: ICD-10-CM

## 2017-03-22 DIAGNOSIS — I21.4 NON-ST ELEVATION (NSTEMI) MYOCARDIAL INFARCTION: ICD-10-CM

## 2017-03-22 DIAGNOSIS — I10 ESSENTIAL (PRIMARY) HYPERTENSION: ICD-10-CM

## 2017-03-22 LAB
ANION GAP SERPL CALC-SCNC: 14 MMOL/L — SIGNIFICANT CHANGE UP (ref 5–17)
BUN SERPL-MCNC: 22 MG/DL — SIGNIFICANT CHANGE UP (ref 7–23)
CALCIUM SERPL-MCNC: 8.4 MG/DL — SIGNIFICANT CHANGE UP (ref 8.4–10.5)
CHLORIDE SERPL-SCNC: 99 MMOL/L — SIGNIFICANT CHANGE UP (ref 96–108)
CO2 SERPL-SCNC: 22 MMOL/L — SIGNIFICANT CHANGE UP (ref 22–31)
CREAT SERPL-MCNC: 0.61 MG/DL — SIGNIFICANT CHANGE UP (ref 0.5–1.3)
GLUCOSE SERPL-MCNC: 138 MG/DL — HIGH (ref 70–99)
HCT VFR BLD CALC: 25.2 % — LOW (ref 39–50)
HGB BLD-MCNC: 8.2 G/DL — LOW (ref 13–17)
MAGNESIUM SERPL-MCNC: 2 MG/DL — SIGNIFICANT CHANGE UP (ref 1.6–2.6)
MCHC RBC-ENTMCNC: 28.6 PG — SIGNIFICANT CHANGE UP (ref 27–34)
MCHC RBC-ENTMCNC: 32.7 GM/DL — SIGNIFICANT CHANGE UP (ref 32–36)
MCV RBC AUTO: 87.5 FL — SIGNIFICANT CHANGE UP (ref 80–100)
PLATELET # BLD AUTO: 146 K/UL — LOW (ref 150–400)
POTASSIUM SERPL-MCNC: 4.2 MMOL/L — SIGNIFICANT CHANGE UP (ref 3.5–5.3)
POTASSIUM SERPL-SCNC: 4.2 MMOL/L — SIGNIFICANT CHANGE UP (ref 3.5–5.3)
RBC # BLD: 2.88 M/UL — LOW (ref 4.2–5.8)
RBC # FLD: 18.4 % — HIGH (ref 10.3–14.5)
SODIUM SERPL-SCNC: 135 MMOL/L — SIGNIFICANT CHANGE UP (ref 135–145)
SRA INTERP SER-IMP: SIGNIFICANT CHANGE UP
WBC # BLD: 6.2 K/UL — SIGNIFICANT CHANGE UP (ref 3.8–10.5)
WBC # FLD AUTO: 6.2 K/UL — SIGNIFICANT CHANGE UP (ref 3.8–10.5)

## 2017-03-22 PROCEDURE — 94002 VENT MGMT INPAT INIT DAY: CPT

## 2017-03-22 PROCEDURE — 86900 BLOOD TYPING SEROLOGIC ABO: CPT

## 2017-03-22 PROCEDURE — 83880 ASSAY OF NATRIURETIC PEPTIDE: CPT

## 2017-03-22 PROCEDURE — 83605 ASSAY OF LACTIC ACID: CPT

## 2017-03-22 PROCEDURE — 82550 ASSAY OF CK (CPK): CPT

## 2017-03-22 PROCEDURE — 82435 ASSAY OF BLOOD CHLORIDE: CPT

## 2017-03-22 PROCEDURE — C1760: CPT

## 2017-03-22 PROCEDURE — 93458 L HRT ARTERY/VENTRICLE ANGIO: CPT

## 2017-03-22 PROCEDURE — P9045: CPT

## 2017-03-22 PROCEDURE — 93005 ELECTROCARDIOGRAM TRACING: CPT

## 2017-03-22 PROCEDURE — 84295 ASSAY OF SERUM SODIUM: CPT

## 2017-03-22 PROCEDURE — 81001 URINALYSIS AUTO W/SCOPE: CPT

## 2017-03-22 PROCEDURE — 82465 ASSAY BLD/SERUM CHOLESTEROL: CPT

## 2017-03-22 PROCEDURE — 84484 ASSAY OF TROPONIN QUANT: CPT

## 2017-03-22 PROCEDURE — 84132 ASSAY OF SERUM POTASSIUM: CPT

## 2017-03-22 PROCEDURE — 85384 FIBRINOGEN ACTIVITY: CPT

## 2017-03-22 PROCEDURE — P9037: CPT

## 2017-03-22 PROCEDURE — 85576 BLOOD PLATELET AGGREGATION: CPT

## 2017-03-22 PROCEDURE — C1751: CPT

## 2017-03-22 PROCEDURE — 83036 HEMOGLOBIN GLYCOSYLATED A1C: CPT

## 2017-03-22 PROCEDURE — P9047: CPT

## 2017-03-22 PROCEDURE — 36430 TRANSFUSION BLD/BLD COMPNT: CPT

## 2017-03-22 PROCEDURE — 86901 BLOOD TYPING SEROLOGIC RH(D): CPT

## 2017-03-22 PROCEDURE — 85730 THROMBOPLASTIN TIME PARTIAL: CPT

## 2017-03-22 PROCEDURE — P9016: CPT

## 2017-03-22 PROCEDURE — 93880 EXTRACRANIAL BILAT STUDY: CPT

## 2017-03-22 PROCEDURE — 80048 BASIC METABOLIC PNL TOTAL CA: CPT

## 2017-03-22 PROCEDURE — 82803 BLOOD GASES ANY COMBINATION: CPT

## 2017-03-22 PROCEDURE — 71045 X-RAY EXAM CHEST 1 VIEW: CPT

## 2017-03-22 PROCEDURE — 85014 HEMATOCRIT: CPT

## 2017-03-22 PROCEDURE — 82330 ASSAY OF CALCIUM: CPT

## 2017-03-22 PROCEDURE — 97161 PT EVAL LOW COMPLEX 20 MIN: CPT

## 2017-03-22 PROCEDURE — 86850 RBC ANTIBODY SCREEN: CPT

## 2017-03-22 PROCEDURE — 85027 COMPLETE CBC AUTOMATED: CPT

## 2017-03-22 PROCEDURE — C1894: CPT

## 2017-03-22 PROCEDURE — 97116 GAIT TRAINING THERAPY: CPT

## 2017-03-22 PROCEDURE — 82565 ASSAY OF CREATININE: CPT

## 2017-03-22 PROCEDURE — C1769: CPT

## 2017-03-22 PROCEDURE — 87640 STAPH A DNA AMP PROBE: CPT

## 2017-03-22 PROCEDURE — 94010 BREATHING CAPACITY TEST: CPT

## 2017-03-22 PROCEDURE — 83735 ASSAY OF MAGNESIUM: CPT

## 2017-03-22 PROCEDURE — 80053 COMPREHEN METABOLIC PANEL: CPT

## 2017-03-22 PROCEDURE — P9059: CPT

## 2017-03-22 PROCEDURE — 86923 COMPATIBILITY TEST ELECTRIC: CPT

## 2017-03-22 PROCEDURE — C1889: CPT

## 2017-03-22 PROCEDURE — 85610 PROTHROMBIN TIME: CPT

## 2017-03-22 PROCEDURE — 82947 ASSAY GLUCOSE BLOOD QUANT: CPT

## 2017-03-22 PROCEDURE — 86891 AUTOLOGOUS BLOOD OP SALVAGE: CPT

## 2017-03-22 PROCEDURE — 86022 PLATELET ANTIBODIES: CPT

## 2017-03-22 PROCEDURE — C1887: CPT

## 2017-03-22 PROCEDURE — 87641 MR-STAPH DNA AMP PROBE: CPT

## 2017-03-22 PROCEDURE — 82553 CREATINE MB FRACTION: CPT

## 2017-03-22 RX ORDER — DOCUSATE SODIUM 100 MG
1 CAPSULE ORAL
Qty: 90 | Refills: 0 | OUTPATIENT
Start: 2017-03-22 | End: 2017-04-21

## 2017-03-22 RX ORDER — FENOFIBRIC ACID 105 MG/1
1 TABLET ORAL
Qty: 0 | Refills: 0 | COMMUNITY

## 2017-03-22 RX ORDER — ATORVASTATIN CALCIUM 80 MG/1
1 TABLET, FILM COATED ORAL
Qty: 30 | Refills: 0 | OUTPATIENT
Start: 2017-03-22 | End: 2017-04-21

## 2017-03-22 RX ORDER — RAMIPRIL 5 MG
1 CAPSULE ORAL
Qty: 0 | Refills: 0 | COMMUNITY

## 2017-03-22 RX ORDER — FAMOTIDINE 10 MG/ML
1 INJECTION INTRAVENOUS
Qty: 30 | Refills: 0 | OUTPATIENT
Start: 2017-03-22 | End: 2017-04-21

## 2017-03-22 RX ORDER — METOPROLOL TARTRATE 50 MG
1 TABLET ORAL
Qty: 90 | Refills: 0 | OUTPATIENT
Start: 2017-03-22 | End: 2017-04-21

## 2017-03-22 RX ORDER — LISINOPRIL 2.5 MG/1
1 TABLET ORAL
Qty: 0 | Refills: 0 | COMMUNITY

## 2017-03-22 RX ORDER — ASPIRIN/CALCIUM CARB/MAGNESIUM 324 MG
1 TABLET ORAL
Qty: 30 | Refills: 0
Start: 2017-03-22 | End: 2017-04-21

## 2017-03-22 RX ORDER — DASATINIB 80 MG/1
1 TABLET ORAL
Qty: 30 | Refills: 0 | OUTPATIENT
Start: 2017-03-22 | End: 2017-04-21

## 2017-03-22 RX ORDER — ATORVASTATIN CALCIUM 80 MG/1
1 TABLET, FILM COATED ORAL
Qty: 0 | Refills: 0 | COMMUNITY

## 2017-03-22 RX ORDER — ASPIRIN/CALCIUM CARB/MAGNESIUM 324 MG
1 TABLET ORAL
Qty: 30 | Refills: 0 | OUTPATIENT
Start: 2017-03-22 | End: 2017-04-21

## 2017-03-22 RX ORDER — METOPROLOL TARTRATE 50 MG
0.5 TABLET ORAL
Qty: 0 | Refills: 0 | COMMUNITY

## 2017-03-22 RX ORDER — ALLOPURINOL 300 MG
1 TABLET ORAL
Qty: 0 | Refills: 0 | COMMUNITY

## 2017-03-22 RX ORDER — METOPROLOL TARTRATE 50 MG
1 TABLET ORAL
Qty: 90 | Refills: 0
Start: 2017-03-22 | End: 2017-04-21

## 2017-03-22 RX ORDER — ATORVASTATIN CALCIUM 80 MG/1
1 TABLET, FILM COATED ORAL
Qty: 30 | Refills: 0
Start: 2017-03-22 | End: 2017-04-21

## 2017-03-22 RX ORDER — CLOPIDOGREL BISULFATE 75 MG/1
1 TABLET, FILM COATED ORAL
Qty: 0 | Refills: 0 | COMMUNITY

## 2017-03-22 RX ORDER — INSULIN ASPART 100 [IU]/ML
15 INJECTION, SOLUTION SUBCUTANEOUS
Qty: 1 | Refills: 0 | OUTPATIENT
Start: 2017-03-22 | End: 2017-04-21

## 2017-03-22 RX ORDER — ENOXAPARIN SODIUM 100 MG/ML
44 INJECTION SUBCUTANEOUS
Qty: 1 | Refills: 0 | OUTPATIENT
Start: 2017-03-22 | End: 2017-04-21

## 2017-03-22 RX ORDER — INSULIN LISPRO 100/ML
15 VIAL (ML) SUBCUTANEOUS
Qty: 1 | Refills: 0 | OUTPATIENT
Start: 2017-03-22 | End: 2017-04-21

## 2017-03-22 RX ORDER — INSULIN DETEMIR 100/ML (3)
44 INSULIN PEN (ML) SUBCUTANEOUS
Qty: 1 | Refills: 0 | OUTPATIENT
Start: 2017-03-22 | End: 2017-04-21

## 2017-03-22 RX ORDER — OXYCODONE HYDROCHLORIDE 5 MG/1
1 TABLET ORAL
Qty: 20 | Refills: 0 | OUTPATIENT
Start: 2017-03-22 | End: 2017-03-27

## 2017-03-22 RX ORDER — DASATINIB 80 MG/1
1 TABLET ORAL
Qty: 0 | Refills: 0 | COMMUNITY

## 2017-03-22 RX ORDER — METOPROLOL TARTRATE 50 MG
1 TABLET ORAL
Qty: 0 | Refills: 0 | DISCHARGE
Start: 2017-03-22 | End: 2017-04-21

## 2017-03-22 RX ORDER — ASPIRIN/CALCIUM CARB/MAGNESIUM 324 MG
1 TABLET ORAL
Qty: 0 | Refills: 0 | COMMUNITY

## 2017-03-22 RX ADMIN — Medication 15 UNIT(S): at 17:12

## 2017-03-22 RX ADMIN — FAMOTIDINE 20 MILLIGRAM(S): 10 INJECTION INTRAVENOUS at 17:11

## 2017-03-22 RX ADMIN — Medication 100 MILLIGRAM(S): at 05:37

## 2017-03-22 RX ADMIN — Medication 100 MILLIGRAM(S): at 11:30

## 2017-03-22 RX ADMIN — Medication 15 UNIT(S): at 11:32

## 2017-03-22 RX ADMIN — Medication 2: at 11:31

## 2017-03-22 RX ADMIN — Medication 25 MILLIGRAM(S): at 05:37

## 2017-03-22 RX ADMIN — Medication 81 MILLIGRAM(S): at 11:30

## 2017-03-22 RX ADMIN — FONDAPARINUX SODIUM 2.5 MILLIGRAM(S): 2.5 INJECTION, SOLUTION SUBCUTANEOUS at 11:31

## 2017-03-22 RX ADMIN — Medication 15 UNIT(S): at 07:40

## 2017-03-22 RX ADMIN — Medication 25 MILLIGRAM(S): at 15:06

## 2017-03-22 RX ADMIN — DASATINIB 100 MILLIGRAM(S): 80 TABLET ORAL at 11:30

## 2017-03-22 RX ADMIN — FAMOTIDINE 20 MILLIGRAM(S): 10 INJECTION INTRAVENOUS at 05:37

## 2017-03-29 PROBLEM — E78.5 HLD (HYPERLIPIDEMIA): Status: ACTIVE | Noted: 2017-03-29

## 2017-03-29 PROBLEM — C92.10 CML (CHRONIC MYELOCYTIC LEUKEMIA): Status: ACTIVE | Noted: 2017-03-29

## 2017-03-29 PROBLEM — I25.10 CAD, MULTIPLE VESSEL: Status: ACTIVE | Noted: 2017-03-29

## 2017-03-29 PROBLEM — M10.9 GOUT: Status: ACTIVE | Noted: 2017-03-29

## 2017-03-29 PROBLEM — Z86.79 HISTORY OF HYPERTENSION: Status: ACTIVE | Noted: 2017-03-29

## 2017-03-29 PROBLEM — E11.9 DIABETES MELLITUS, TYPE 2: Status: ACTIVE | Noted: 2017-03-29

## 2017-03-29 RX ORDER — ASPIRIN ENTERIC COATED TABLETS 81 MG 81 MG/1
81 TABLET, DELAYED RELEASE ORAL DAILY
Refills: 0 | Status: ACTIVE | COMMUNITY

## 2017-03-29 RX ORDER — FAMOTIDINE 20 MG/1
20 TABLET, FILM COATED ORAL
Refills: 0 | Status: ACTIVE | COMMUNITY

## 2017-03-29 RX ORDER — ATORVASTATIN CALCIUM 80 MG/1
80 TABLET, FILM COATED ORAL DAILY
Qty: 90 | Refills: 3 | Status: ACTIVE | COMMUNITY

## 2017-03-29 RX ORDER — OXYCODONE HYDROCHLORIDE AND ACETAMINOPHEN 5; 325 MG/1; MG/1
5-325 TABLET ORAL
Qty: 30 | Refills: 0 | Status: ACTIVE | COMMUNITY

## 2017-03-29 RX ORDER — METOPROLOL TARTRATE 25 MG/1
25 TABLET, FILM COATED ORAL
Qty: 30 | Refills: 0 | Status: ACTIVE | COMMUNITY

## 2017-03-29 RX ORDER — ADHESIVE BANDAGE
80 BANDAGE TOPICAL
Qty: 40 | Refills: 0 | Status: ACTIVE | COMMUNITY
Start: 2016-12-02

## 2017-03-29 RX ORDER — DASATINIB 100 MG/1
100 TABLET ORAL
Refills: 0 | Status: ACTIVE | COMMUNITY

## 2017-03-29 RX ORDER — INSULIN LISPRO 100 [IU]/ML
100 INJECTION, SOLUTION INTRAVENOUS; SUBCUTANEOUS
Refills: 0 | Status: ACTIVE | COMMUNITY

## 2017-03-29 RX ORDER — DOCUSATE SODIUM 100 MG/1
100 CAPSULE ORAL 3 TIMES DAILY
Refills: 0 | Status: ACTIVE | COMMUNITY

## 2017-03-29 RX ORDER — INSULIN GLARGINE 100 [IU]/ML
100 INJECTION, SOLUTION SUBCUTANEOUS
Refills: 0 | Status: ACTIVE | COMMUNITY

## 2017-04-03 ENCOUNTER — APPOINTMENT (OUTPATIENT)
Dept: CARDIOTHORACIC SURGERY | Facility: CLINIC | Age: 52
End: 2017-04-03

## 2017-04-03 VITALS
SYSTOLIC BLOOD PRESSURE: 133 MMHG | HEART RATE: 82 BPM | OXYGEN SATURATION: 98 % | RESPIRATION RATE: 15 BRPM | DIASTOLIC BLOOD PRESSURE: 82 MMHG | WEIGHT: 195 LBS | BODY MASS INDEX: 28.88 KG/M2 | HEIGHT: 69 IN | TEMPERATURE: 98.7 F

## 2017-04-03 DIAGNOSIS — E11.9 TYPE 2 DIABETES MELLITUS W/OUT COMPLICATIONS: ICD-10-CM

## 2017-04-03 DIAGNOSIS — M10.9 GOUT, UNSPECIFIED: ICD-10-CM

## 2017-04-03 DIAGNOSIS — E78.5 HYPERLIPIDEMIA, UNSPECIFIED: ICD-10-CM

## 2017-04-03 DIAGNOSIS — C92.10 CHRONIC MYELOID LEUKEMIA, BCR/ABL-POSITIVE, NOT HAVING ACHIEVED REMISSION: ICD-10-CM

## 2017-04-03 DIAGNOSIS — Z86.79 PERSONAL HISTORY OF OTHER DISEASES OF THE CIRCULATORY SYSTEM: ICD-10-CM

## 2017-04-03 DIAGNOSIS — I25.10 ATHEROSCLEROTIC HEART DISEASE OF NATIVE CORONARY ARTERY W/OUT ANGINA PECTORIS: ICD-10-CM

## 2017-04-03 RX ORDER — FUROSEMIDE 20 MG/1
20 TABLET ORAL DAILY
Qty: 30 | Refills: 0 | Status: ACTIVE | OUTPATIENT

## 2017-04-03 RX ORDER — POTASSIUM CHLORIDE 750 MG/1
10 TABLET, EXTENDED RELEASE ORAL DAILY
Qty: 30 | Refills: 0 | Status: ACTIVE | OUTPATIENT

## 2017-04-07 ENCOUNTER — APPOINTMENT (OUTPATIENT)
Dept: CARDIOTHORACIC SURGERY | Facility: CLINIC | Age: 52
End: 2017-04-07

## 2017-04-07 VITALS — WEIGHT: 187 LBS | BODY MASS INDEX: 27.7 KG/M2 | HEIGHT: 69 IN

## 2017-04-07 VITALS — DIASTOLIC BLOOD PRESSURE: 75 MMHG | SYSTOLIC BLOOD PRESSURE: 121 MMHG

## 2017-04-07 VITALS
RESPIRATION RATE: 15 BRPM | DIASTOLIC BLOOD PRESSURE: 80 MMHG | TEMPERATURE: 98.4 F | OXYGEN SATURATION: 95 % | SYSTOLIC BLOOD PRESSURE: 129 MMHG | HEART RATE: 89 BPM

## 2017-04-07 DIAGNOSIS — Z95.1 PRESENCE OF AORTOCORONARY BYPASS GRAFT: ICD-10-CM

## 2017-04-07 DIAGNOSIS — Z09 ENCOUNTER FOR FOLLOW-UP EXAMINATION AFTER COMPLETED TREATMENT FOR CONDITIONS OTHER THAN MALIGNANT NEOPLASM: ICD-10-CM

## 2017-04-07 RX ORDER — HYDROCODONE BITARTRATE AND HOMATROPINE METHYLBROMIDE 5; 1.5 MG/5ML; MG/5ML
5-1.5 SYRUP ORAL
Qty: 1 | Refills: 0 | Status: COMPLETED | OUTPATIENT
End: 2017-04-07

## 2017-04-10 ENCOUNTER — APPOINTMENT (OUTPATIENT)
Dept: CARDIOTHORACIC SURGERY | Facility: CLINIC | Age: 52
End: 2017-04-10

## 2017-07-14 ENCOUNTER — INPATIENT (INPATIENT)
Facility: HOSPITAL | Age: 52
LOS: 2 days | Discharge: HOME | DRG: 194 | End: 2017-07-17
Attending: INTERNAL MEDICINE | Admitting: INTERNAL MEDICINE
Payer: COMMERCIAL

## 2017-07-14 VITALS
TEMPERATURE: 98 F | OXYGEN SATURATION: 100 % | RESPIRATION RATE: 19 BRPM | SYSTOLIC BLOOD PRESSURE: 145 MMHG | DIASTOLIC BLOOD PRESSURE: 87 MMHG | WEIGHT: 182.98 LBS | HEIGHT: 69 IN | HEART RATE: 80 BPM

## 2017-07-14 DIAGNOSIS — Z29.9 ENCOUNTER FOR PROPHYLACTIC MEASURES, UNSPECIFIED: ICD-10-CM

## 2017-07-14 DIAGNOSIS — R11.10 VOMITING, UNSPECIFIED: ICD-10-CM

## 2017-07-14 DIAGNOSIS — J18.9 PNEUMONIA, UNSPECIFIED ORGANISM: ICD-10-CM

## 2017-07-14 DIAGNOSIS — C92.10 CHRONIC MYELOID LEUKEMIA, BCR/ABL-POSITIVE, NOT HAVING ACHIEVED REMISSION: ICD-10-CM

## 2017-07-14 DIAGNOSIS — I10 ESSENTIAL (PRIMARY) HYPERTENSION: ICD-10-CM

## 2017-07-14 DIAGNOSIS — Z95.1 PRESENCE OF AORTOCORONARY BYPASS GRAFT: Chronic | ICD-10-CM

## 2017-07-14 DIAGNOSIS — E11.9 TYPE 2 DIABETES MELLITUS WITHOUT COMPLICATIONS: ICD-10-CM

## 2017-07-14 LAB
ALBUMIN SERPL ELPH-MCNC: 2.9 G/DL — LOW (ref 3.5–5)
ALP SERPL-CCNC: 57 U/L — SIGNIFICANT CHANGE UP (ref 40–120)
ALT FLD-CCNC: 27 U/L DA — SIGNIFICANT CHANGE UP (ref 10–60)
ANION GAP SERPL CALC-SCNC: 9 MMOL/L — SIGNIFICANT CHANGE UP (ref 5–17)
APTT BLD: 27.5 SEC — SIGNIFICANT CHANGE UP (ref 27.5–37.4)
AST SERPL-CCNC: 21 U/L — SIGNIFICANT CHANGE UP (ref 10–40)
BILIRUB SERPL-MCNC: 0.3 MG/DL — SIGNIFICANT CHANGE UP (ref 0.2–1.2)
BUN SERPL-MCNC: 20 MG/DL — HIGH (ref 7–18)
CALCIUM SERPL-MCNC: 8.1 MG/DL — LOW (ref 8.4–10.5)
CHLORIDE SERPL-SCNC: 102 MMOL/L — SIGNIFICANT CHANGE UP (ref 96–108)
CK MB BLD-MCNC: 0.5 % — SIGNIFICANT CHANGE UP (ref 0–3.5)
CK MB CFR SERPL CALC: 1.2 NG/ML — SIGNIFICANT CHANGE UP (ref 0–3.6)
CK SERPL-CCNC: 219 U/L — SIGNIFICANT CHANGE UP (ref 35–232)
CO2 SERPL-SCNC: 25 MMOL/L — SIGNIFICANT CHANGE UP (ref 22–31)
CREAT SERPL-MCNC: 0.65 MG/DL — SIGNIFICANT CHANGE UP (ref 0.5–1.3)
GLUCOSE SERPL-MCNC: 162 MG/DL — HIGH (ref 70–99)
HCT VFR BLD CALC: 32.2 % — LOW (ref 39–50)
HGB BLD-MCNC: 10.6 G/DL — LOW (ref 13–17)
INR BLD: 1.13 RATIO — SIGNIFICANT CHANGE UP (ref 0.88–1.16)
LEGIONELLA AG UR QL: NEGATIVE — SIGNIFICANT CHANGE UP
LIDOCAIN IGE QN: 99 U/L — SIGNIFICANT CHANGE UP (ref 73–393)
MCHC RBC-ENTMCNC: 27 PG — SIGNIFICANT CHANGE UP (ref 27–34)
MCHC RBC-ENTMCNC: 32.9 GM/DL — SIGNIFICANT CHANGE UP (ref 32–36)
MCV RBC AUTO: 82 FL — SIGNIFICANT CHANGE UP (ref 80–100)
NT-PROBNP SERPL-SCNC: 291 PG/ML — HIGH (ref 0–125)
PLATELET # BLD AUTO: 137 K/UL — LOW (ref 150–400)
POTASSIUM SERPL-MCNC: 4.4 MMOL/L — SIGNIFICANT CHANGE UP (ref 3.5–5.3)
POTASSIUM SERPL-SCNC: 4.4 MMOL/L — SIGNIFICANT CHANGE UP (ref 3.5–5.3)
PROT SERPL-MCNC: 7 G/DL — SIGNIFICANT CHANGE UP (ref 6–8.3)
PROTHROM AB SERPL-ACNC: 12.3 SEC — SIGNIFICANT CHANGE UP (ref 9.8–12.7)
RBC # BLD: 3.93 M/UL — LOW (ref 4.2–5.8)
RBC # FLD: 16.9 % — HIGH (ref 10.3–14.5)
SODIUM SERPL-SCNC: 136 MMOL/L — SIGNIFICANT CHANGE UP (ref 135–145)
TROPONIN I SERPL-MCNC: 0.04 NG/ML — SIGNIFICANT CHANGE UP (ref 0–0.04)
WBC # BLD: 7 K/UL — SIGNIFICANT CHANGE UP (ref 3.8–10.5)
WBC # FLD AUTO: 7 K/UL — SIGNIFICANT CHANGE UP (ref 3.8–10.5)

## 2017-07-14 PROCEDURE — 71020: CPT | Mod: 26

## 2017-07-14 PROCEDURE — 74020: CPT | Mod: 26

## 2017-07-14 PROCEDURE — 99285 EMERGENCY DEPT VISIT HI MDM: CPT | Mod: 25

## 2017-07-14 RX ORDER — ONDANSETRON 8 MG/1
4 TABLET, FILM COATED ORAL ONCE
Qty: 0 | Refills: 0 | Status: COMPLETED | OUTPATIENT
Start: 2017-07-14 | End: 2017-07-14

## 2017-07-14 RX ORDER — GLUCAGON INJECTION, SOLUTION 0.5 MG/.1ML
1 INJECTION, SOLUTION SUBCUTANEOUS ONCE
Qty: 0 | Refills: 0 | Status: DISCONTINUED | OUTPATIENT
Start: 2017-07-14 | End: 2017-07-17

## 2017-07-14 RX ORDER — INSULIN LISPRO 100/ML
VIAL (ML) SUBCUTANEOUS
Qty: 0 | Refills: 0 | Status: DISCONTINUED | OUTPATIENT
Start: 2017-07-14 | End: 2017-07-17

## 2017-07-14 RX ORDER — SODIUM CHLORIDE 9 MG/ML
1000 INJECTION INTRAMUSCULAR; INTRAVENOUS; SUBCUTANEOUS ONCE
Qty: 0 | Refills: 0 | Status: COMPLETED | OUTPATIENT
Start: 2017-07-14 | End: 2017-07-14

## 2017-07-14 RX ORDER — FAMOTIDINE 10 MG/ML
20 INJECTION INTRAVENOUS ONCE
Qty: 0 | Refills: 0 | Status: COMPLETED | OUTPATIENT
Start: 2017-07-14 | End: 2017-07-14

## 2017-07-14 RX ORDER — ONDANSETRON 8 MG/1
4 TABLET, FILM COATED ORAL EVERY 6 HOURS
Qty: 0 | Refills: 0 | Status: DISCONTINUED | OUTPATIENT
Start: 2017-07-14 | End: 2017-07-17

## 2017-07-14 RX ORDER — INSULIN GLARGINE 100 [IU]/ML
20 INJECTION, SOLUTION SUBCUTANEOUS AT BEDTIME
Qty: 0 | Refills: 0 | Status: DISCONTINUED | OUTPATIENT
Start: 2017-07-14 | End: 2017-07-17

## 2017-07-14 RX ORDER — CLOPIDOGREL BISULFATE 75 MG/1
75 TABLET, FILM COATED ORAL ONCE
Qty: 0 | Refills: 0 | Status: COMPLETED | OUTPATIENT
Start: 2017-07-14 | End: 2017-07-14

## 2017-07-14 RX ORDER — AZITHROMYCIN 500 MG/1
TABLET, FILM COATED ORAL
Qty: 0 | Refills: 0 | Status: DISCONTINUED | OUTPATIENT
Start: 2017-07-14 | End: 2017-07-17

## 2017-07-14 RX ORDER — SENNA PLUS 8.6 MG/1
2 TABLET ORAL AT BEDTIME
Qty: 0 | Refills: 0 | Status: DISCONTINUED | OUTPATIENT
Start: 2017-07-14 | End: 2017-07-17

## 2017-07-14 RX ORDER — CEFTRIAXONE 500 MG/1
INJECTION, POWDER, FOR SOLUTION INTRAMUSCULAR; INTRAVENOUS
Qty: 0 | Refills: 0 | Status: DISCONTINUED | OUTPATIENT
Start: 2017-07-14 | End: 2017-07-17

## 2017-07-14 RX ORDER — LACTULOSE 10 G/15ML
10 SOLUTION ORAL DAILY
Qty: 0 | Refills: 0 | Status: DISCONTINUED | OUTPATIENT
Start: 2017-07-14 | End: 2017-07-17

## 2017-07-14 RX ORDER — ENOXAPARIN SODIUM 100 MG/ML
40 INJECTION SUBCUTANEOUS DAILY
Qty: 0 | Refills: 0 | Status: DISCONTINUED | OUTPATIENT
Start: 2017-07-14 | End: 2017-07-17

## 2017-07-14 RX ORDER — DOCUSATE SODIUM 100 MG
100 CAPSULE ORAL THREE TIMES A DAY
Qty: 0 | Refills: 0 | Status: DISCONTINUED | OUTPATIENT
Start: 2017-07-14 | End: 2017-07-17

## 2017-07-14 RX ORDER — AZITHROMYCIN 500 MG/1
500 TABLET, FILM COATED ORAL ONCE
Qty: 0 | Refills: 0 | Status: COMPLETED | OUTPATIENT
Start: 2017-07-14 | End: 2017-07-14

## 2017-07-14 RX ORDER — ASPIRIN/CALCIUM CARB/MAGNESIUM 324 MG
81 TABLET ORAL DAILY
Qty: 0 | Refills: 0 | Status: DISCONTINUED | OUTPATIENT
Start: 2017-07-14 | End: 2017-07-17

## 2017-07-14 RX ORDER — CEFTRIAXONE 500 MG/1
1 INJECTION, POWDER, FOR SOLUTION INTRAMUSCULAR; INTRAVENOUS ONCE
Qty: 0 | Refills: 0 | Status: COMPLETED | OUTPATIENT
Start: 2017-07-14 | End: 2017-07-14

## 2017-07-14 RX ORDER — DEXTROSE 50 % IN WATER 50 %
12.5 SYRINGE (ML) INTRAVENOUS ONCE
Qty: 0 | Refills: 0 | Status: DISCONTINUED | OUTPATIENT
Start: 2017-07-14 | End: 2017-07-17

## 2017-07-14 RX ORDER — SODIUM CHLORIDE 9 MG/ML
1000 INJECTION INTRAMUSCULAR; INTRAVENOUS; SUBCUTANEOUS
Qty: 0 | Refills: 0 | Status: DISCONTINUED | OUTPATIENT
Start: 2017-07-14 | End: 2017-07-17

## 2017-07-14 RX ORDER — SODIUM CHLORIDE 9 MG/ML
1000 INJECTION, SOLUTION INTRAVENOUS
Qty: 0 | Refills: 0 | Status: DISCONTINUED | OUTPATIENT
Start: 2017-07-14 | End: 2017-07-17

## 2017-07-14 RX ORDER — ATORVASTATIN CALCIUM 80 MG/1
80 TABLET, FILM COATED ORAL AT BEDTIME
Qty: 0 | Refills: 0 | Status: DISCONTINUED | OUTPATIENT
Start: 2017-07-14 | End: 2017-07-17

## 2017-07-14 RX ORDER — DEXTROSE 50 % IN WATER 50 %
25 SYRINGE (ML) INTRAVENOUS ONCE
Qty: 0 | Refills: 0 | Status: DISCONTINUED | OUTPATIENT
Start: 2017-07-14 | End: 2017-07-17

## 2017-07-14 RX ORDER — METOPROLOL TARTRATE 50 MG
25 TABLET ORAL
Qty: 0 | Refills: 0 | Status: DISCONTINUED | OUTPATIENT
Start: 2017-07-14 | End: 2017-07-17

## 2017-07-14 RX ORDER — CEFTRIAXONE 500 MG/1
1 INJECTION, POWDER, FOR SOLUTION INTRAMUSCULAR; INTRAVENOUS EVERY 24 HOURS
Qty: 0 | Refills: 0 | Status: DISCONTINUED | OUTPATIENT
Start: 2017-07-15 | End: 2017-07-17

## 2017-07-14 RX ORDER — DEXTROSE 50 % IN WATER 50 %
1 SYRINGE (ML) INTRAVENOUS ONCE
Qty: 0 | Refills: 0 | Status: DISCONTINUED | OUTPATIENT
Start: 2017-07-14 | End: 2017-07-17

## 2017-07-14 RX ORDER — AZITHROMYCIN 500 MG/1
500 TABLET, FILM COATED ORAL EVERY 24 HOURS
Qty: 0 | Refills: 0 | Status: DISCONTINUED | OUTPATIENT
Start: 2017-07-15 | End: 2017-07-17

## 2017-07-14 RX ADMIN — ONDANSETRON 4 MILLIGRAM(S): 8 TABLET, FILM COATED ORAL at 02:28

## 2017-07-14 RX ADMIN — CEFTRIAXONE 100 GRAM(S): 500 INJECTION, POWDER, FOR SOLUTION INTRAMUSCULAR; INTRAVENOUS at 03:15

## 2017-07-14 RX ADMIN — INSULIN GLARGINE 20 UNIT(S): 100 INJECTION, SOLUTION SUBCUTANEOUS at 21:20

## 2017-07-14 RX ADMIN — FAMOTIDINE 20 MILLIGRAM(S): 10 INJECTION INTRAVENOUS at 02:25

## 2017-07-14 RX ADMIN — Medication 2: at 12:11

## 2017-07-14 RX ADMIN — Medication 100 MILLIGRAM(S): at 17:58

## 2017-07-14 RX ADMIN — ONDANSETRON 4 MILLIGRAM(S): 8 TABLET, FILM COATED ORAL at 03:13

## 2017-07-14 RX ADMIN — Medication 25 MILLIGRAM(S): at 17:56

## 2017-07-14 RX ADMIN — ENOXAPARIN SODIUM 40 MILLIGRAM(S): 100 INJECTION SUBCUTANEOUS at 12:11

## 2017-07-14 RX ADMIN — Medication 4: at 21:20

## 2017-07-14 RX ADMIN — Medication 2: at 07:19

## 2017-07-14 RX ADMIN — Medication 25 MILLIGRAM(S): at 05:49

## 2017-07-14 RX ADMIN — SENNA PLUS 2 TABLET(S): 8.6 TABLET ORAL at 21:21

## 2017-07-14 RX ADMIN — Medication 81 MILLIGRAM(S): at 12:11

## 2017-07-14 RX ADMIN — SODIUM CHLORIDE 1000 MILLILITER(S): 9 INJECTION INTRAMUSCULAR; INTRAVENOUS; SUBCUTANEOUS at 02:18

## 2017-07-14 RX ADMIN — SODIUM CHLORIDE 75 MILLILITER(S): 9 INJECTION INTRAMUSCULAR; INTRAVENOUS; SUBCUTANEOUS at 21:20

## 2017-07-14 RX ADMIN — SODIUM CHLORIDE 75 MILLILITER(S): 9 INJECTION INTRAMUSCULAR; INTRAVENOUS; SUBCUTANEOUS at 05:50

## 2017-07-14 RX ADMIN — ATORVASTATIN CALCIUM 80 MILLIGRAM(S): 80 TABLET, FILM COATED ORAL at 21:19

## 2017-07-14 RX ADMIN — CLOPIDOGREL BISULFATE 75 MILLIGRAM(S): 75 TABLET, FILM COATED ORAL at 05:38

## 2017-07-14 RX ADMIN — SODIUM CHLORIDE 75 MILLILITER(S): 9 INJECTION INTRAMUSCULAR; INTRAVENOUS; SUBCUTANEOUS at 17:56

## 2017-07-14 RX ADMIN — AZITHROMYCIN 250 MILLIGRAM(S): 500 TABLET, FILM COATED ORAL at 03:42

## 2017-07-14 NOTE — H&P ADULT - PROBLEM SELECTOR PLAN 1
-Likely CAP, f/u blood culture, legionella Ag, and streptococcal Ag  -No leukocytosis, afebrile  -CXR evidence of RUL infiltration, RUL rales on physical exam  -Robitussin PRN for cough  -Continue Rocephin and Zithromax for suspected CAP

## 2017-07-14 NOTE — H&P ADULT - PROBLEM SELECTOR PLAN 3
-Patient is taking daily medication for CML but does not know the med(likely imatinib, patient does not take Sprycel anymore due to diarrhea), will hold cancer pill for active infection  -F/U with Dr. Contreras as an outpatient for CML.

## 2017-07-14 NOTE — H&P ADULT - HISTORY OF PRESENT ILLNESS
52 years old male from home, lives with family, with PMH of CML(on ?imatinib-pt not sure about medication, saying med start with 'I"), DM(last Hba1c 10.8, on insulin, HLD, HTN, and CAD s/p CABG(3/17/2017, quadraple bypass) presented to ED c/o multiple times of NBNB vomiting onset 11:40PM, after taking Levaquin 750mg at around 10:30PM. Patient has been following up hematologist Dr. Che Contreras for CML, taking medication for CML daily, however since 15 days ago he has had dry cough so he went to see Dr. Contreras who recommended CXR and stop taking cancer medication. CXR showed pneumonia, so Dr. Contreras called him and prescribed Levaquin 750mg. After taking 1 pill of Levaquin at home yesterday, he suddenly started vomiting and abdominal pain which led him to come to the hospital. Vomiting stopped after coming to the hospital and when I saw the patient, he was not in distress and had stable vital signs. Patient denied SOB, chest pain, dizziness, fever, chills, diarrhea, or any other symptoms. He did not have any vomiting after finishing his lunch or dinner today. CXR showed Rt upper lobe opacity concern for pneumonia. Admitted to the medicine service for CAP requiring IV medication, levaquin intolerance.    ** Primary team please follow up and reconcile his medication from his pharmacy, patient does not know his medications. [facility] [address] [facility], [address]. Giving aspirin and 1 dose of plavix and high dose statin for h/o recent CABG. 52 years old male from home, lives with family, with PMH of CML(on ?imatinib-pt not sure about medication, saying med start with 'I"), DM(last Hba1c 10.8, on insulin, HLD, HTN, and CAD s/p CABG(3/17/2017, quadraple bypass) presented to ED c/o multiple times of NBNB vomiting onset 11:40PM, after taking Levaquin 750mg at around 10:30PM. Patient has been following up hematologist Dr. Che Contreras for CML, taking medication for CML daily, however since 15 days ago he has had dry cough so he went to see Dr. Contreras who recommended CXR and stop taking cancer medication. CXR showed pneumonia, so Dr. Contreras called him and prescribed Levaquin 750mg. After taking 1 pill of Levaquin at home yesterday, he suddenly started vomiting and abdominal pain which led him to come to the hospital. Vomiting stopped after coming to the hospital and when I saw the patient, he was not in distress and had stable vital signs. Patient denied SOB, chest pain, dizziness, fever, chills, diarrhea, or any other symptoms. He did not have any vomiting after finishing his lunch or dinner today. CXR showed Rt upper lobe opacity concern for pneumonia. Admitted to the medicine service for CAP requiring IV medication, levaquin intolerance.    ** Primary team please follow up and reconcile his medication with his pharmacy, patient does not know his medications. [facility] [address] [facility], [address], in the system. Giving aspirin,1 dose of plavix and high dose statin for h/o recent CABG. 52 years old male from home, lives with family, with PMH of CML(on ?imatinib-pt not sure about medication, saying med start with 'I"), DM(last Hba1c 10.8, on insulin, HLD, HTN, and CAD s/p CABG(3/17/2017, quadruple bypass) presented to ED c/o multiple times of NBNB vomiting onset 11:40PM, after taking Levaquin 750mg at around 10:30PM. Patient has been following up hematologist Dr. Che Contreras for CML, taking medication for CML daily, however since 15 days ago he has had dry cough so he went to see Dr. Contreras who recommended CXR and stop taking cancer medication. CXR showed pneumonia, so Dr. Contreras called him and prescribed Levaquin 750mg. After taking 1 pill of Levaquin at home yesterday, he suddenly started vomiting and abdominal pain which led him to come to the hospital. Vomiting stopped after coming to the hospital and when I saw the patient, he was not in distress and had stable vital signs. Patient denied SOB, chest pain, dizziness, fever, chills, diarrhea, or any other symptoms. He did not have any vomiting after finishing his lunch or dinner today. CXR showed Rt upper lobe opacity concern for pneumonia. Admitted to the medicine service for CAP requiring IV medication, levaquin intolerance.    ** Primary team please follow up and reconcile his medication with his pharmacy, patient does not know his medications. [facility] [address] [facility], [address], in the system. Giving aspirin,1 dose of plavix and high dose statin for h/o recent CABG.

## 2017-07-14 NOTE — H&P ADULT - NSHPPHYSICALEXAM_GEN_ALL_CORE
PHYSICAL EXAM:    GENERAL: NAD.  HEENT:  NC/AT, EOMI, PERRLA, No scleral icterus, Moist mucous membranes  NECK: Supple, No JVD  CNS:  Alert & Oriented X3, Motor Strength 5/5 B/L upper and lower extremities.  LUNG: RUL rales, No rhonchi, No wheezing  HEART: RRR; No murmurs, No rubs  ABDOMEN: +BS, ST/ND/NT  GENITOURINARY- Voiding, Bladder not distended  EXTREMITIES:  2+ Peripheral Pulses, No clubbing, No cyanosis, No tibial edema  MUSCULOSKELTAL: Joints normal ROM, No joint tenderness, No muscle tenderness  RECTAL: deferred, not indicated  BREAST: deferred PHYSICAL EXAM:    GENERAL: NAD.  HEENT:  NC/AT, EOMI, PERRLA, No scleral icterus, Moist mucous membranes  NECK: Supple, No JVD  CNS:  Alert & Oriented X3, Motor Strength 5/5 B/L upper and lower extremities.  LUNG: RUL rales, No rhonchi, No wheezing  HEART: RRR; No murmurs, No rubs  ABDOMEN: +BS, ST/ND/NT  GENITOURINARY- Voiding, Bladder not distended  EXTREMITIES:  2+ Peripheral Pulses, No clubbing, No cyanosis, No tibial edema  MUSCULOSKELTAL: Joints normal ROM, No joint tenderness, No muscle tenderness  RECTAL: deferred, not indicated  BREAST: deferred    T(C): 36.7 (07-14-17 @ 04:58), Max: 36.7 (07-14-17 @ 01:51)  HR: 79 (07-14-17 @ 04:58) (79 - 80)  BP: 135/74 (07-14-17 @ 04:58) (135/74 - 145/87)  RR: 19 (07-14-17 @ 04:58) (19 - 19)  SpO2: 100% (07-14-17 @ 04:58) (100% - 100%)  Wt(kg): --

## 2017-07-14 NOTE — PATIENT PROFILE ADULT. - VISION (WITH CORRECTIVE LENSES IF THE PATIENT USUALLY WEARS THEM):
uses glasses for driving/Partially impaired: cannot see medication labels or newsprint, but can see obstacles in path, and the surrounding layout; can count fingers at arm's length

## 2017-07-14 NOTE — ED ADULT NURSE REASSESSMENT NOTE - NS ED NURSE REASSESS COMMENT FT1
Report received from LUIS Allen. Patient resting in bed. AA&Ox3. Breathing on 2LPM O2 per NC. Denies pain or SOB. NS infusing by gravity to left hand IVHL; no signs of infiltration observed.

## 2017-07-14 NOTE — ED ADULT NURSE NOTE - OBJECTIVE STATEMENT
53 y/o came in via EMS with c/o nausea and vomiting pt accompanied by son with IV   and NS in progress upon arrival pt denies any chest pain ,fever ,cough @ this time

## 2017-07-14 NOTE — H&P ADULT - PROBLEM SELECTOR PLAN 4
-h/o Uncontrolled DM, starting Lantus 20U + moderate sliding scale, consistent carbohydrate diet  -Monitor accucheck  -Primary team please reconcile meds with his pharmacy and start medications.

## 2017-07-14 NOTE — ED ADULT NURSE NOTE - NS ED NURSE REPORT GIVEN TO FT
Lactation consult completed with patient of baby in NICU: See maternal lactation flow sheet and infant educational record  The following services and education has been provided:  · Introduced to Lactation Services  · Breast pump set up   · Collection and storage of breast milk for a NICU baby  · NICU breastfeeding/breast milk information folder given and discussed  · Pumping frequency and using of pump log sheet  · Instructions to take pump supplies to NICU on discharge from hospital  · Informed to get an insulated bag from NICU to transport her breast milk in  Denies to having any further questions at this time. Denice Blankenship RN, IBCLC  
Kiera SMITH

## 2017-07-14 NOTE — H&P ADULT - NSHPSOCIALHISTORY_GEN_ALL_CORE
No smoking or ETOH use, illicit drug use reported  Patient was immigrated from [facility] 30 years ago  Works as a building maintenance.  No sick contact or recent travel reported.  Flu vaccination up to date

## 2017-07-14 NOTE — H&P ADULT - ASSESSMENT
Patient is a 52y old  Male who presents with a chief complaint of Vomiting multiple times onset 11:40 PM, and was admitted to the medicine floor for CAP, intolerance to PO Levaquin.

## 2017-07-14 NOTE — H&P ADULT - NSHPLABSRESULTS_GEN_ALL_CORE
LABS:                        10.6   7.0   )-----------( 137      ( 14 Jul 2017 02:19 )             32.2     07-14    136  |  102  |  20<H>  ----------------------------<  162<H>  4.4   |  25  |  0.65    Ca    8.1<L>      14 Jul 2017 02:19    TPro  7.0  /  Alb  2.9<L>  /  TBili  0.3  /  DBili  x   /  AST  21  /  ALT  27  /  AlkPhos  57  07-14    PT/INR - ( 14 Jul 2017 02:19 )   PT: 12.3 sec;   INR: 1.13 ratio         PTT - ( 14 Jul 2017 02:19 )  PTT:27.5 sec    CAPILLARY BLOOD GLUCOSE  158 (14 Jul 2017 01:51)        LIVER FUNCTIONS - ( 14 Jul 2017 02:19 )  Alb: 2.9 g/dL / Pro: 7.0 g/dL / ALK PHOS: 57 U/L / ALT: 27 U/L DA / AST: 21 U/L / GGT: x             CARDIAC MARKERS ( 14 Jul 2017 02:19 )  0.039 ng/mL / x     / 219 U/L / x     / 1.2 ng/mL

## 2017-07-14 NOTE — H&P ADULT - PROBLEM SELECTOR PLAN 2
-Acute onset, subsided, will continue PRN zofran for nausea. Pt did not have diarrhea.  -Likely from Levaquin intolerance, hold levaquin  -Giving IV fluid for 24hrs.  -Resumed diet since symptoms resolved.

## 2017-07-14 NOTE — H&P ADULT - PROBLEM SELECTOR PLAN 5
-BP in range, started metoprolol 25mg q 12hrs with parameters for h/o CAD, but primary team please reconcile meds with his pharmacy and start medications.

## 2017-07-14 NOTE — ED PROVIDER NOTE - PMH
CAD (coronary artery disease)    CML (chronic myelocytic leukemia)    DM (diabetes mellitus)    Gout    HLD (hyperlipidemia)    Hypertension    PNA (pneumonia)

## 2017-07-15 LAB
24R-OH-CALCIDIOL SERPL-MCNC: 28.6 NG/ML — LOW (ref 30–100)
ANION GAP SERPL CALC-SCNC: 7 MMOL/L — SIGNIFICANT CHANGE UP (ref 5–17)
BASOPHILS # BLD AUTO: 0 K/UL — SIGNIFICANT CHANGE UP (ref 0–0.2)
BASOPHILS NFR BLD AUTO: 0.6 % — SIGNIFICANT CHANGE UP (ref 0–2)
BUN SERPL-MCNC: 12 MG/DL — SIGNIFICANT CHANGE UP (ref 7–18)
CALCIUM SERPL-MCNC: 7.9 MG/DL — LOW (ref 8.4–10.5)
CHLORIDE SERPL-SCNC: 107 MMOL/L — SIGNIFICANT CHANGE UP (ref 96–108)
CHOLEST SERPL-MCNC: 132 MG/DL — SIGNIFICANT CHANGE UP (ref 10–199)
CO2 SERPL-SCNC: 25 MMOL/L — SIGNIFICANT CHANGE UP (ref 22–31)
CREAT SERPL-MCNC: 0.41 MG/DL — LOW (ref 0.5–1.3)
EOSINOPHIL # BLD AUTO: 0.2 K/UL — SIGNIFICANT CHANGE UP (ref 0–0.5)
EOSINOPHIL NFR BLD AUTO: 6.5 % — HIGH (ref 0–6)
FOLATE SERPL-MCNC: 10.9 NG/ML — SIGNIFICANT CHANGE UP (ref 4.8–24.2)
GLUCOSE SERPL-MCNC: 150 MG/DL — HIGH (ref 70–99)
HAV IGM SER-ACNC: SIGNIFICANT CHANGE UP
HBA1C BLD-MCNC: 7.3 % — HIGH (ref 4–5.6)
HBV CORE IGM SER-ACNC: SIGNIFICANT CHANGE UP
HBV SURFACE AG SER-ACNC: SIGNIFICANT CHANGE UP
HCT VFR BLD CALC: 34.3 % — LOW (ref 39–50)
HCV AB S/CO SERPL IA: 0.08 S/CO — SIGNIFICANT CHANGE UP
HCV AB SERPL-IMP: SIGNIFICANT CHANGE UP
HDLC SERPL-MCNC: 31 MG/DL — LOW (ref 40–125)
HGB BLD-MCNC: 11.3 G/DL — LOW (ref 13–17)
LIPID PNL WITH DIRECT LDL SERPL: 77 MG/DL — SIGNIFICANT CHANGE UP
LYMPHOCYTES # BLD AUTO: 1.3 K/UL — SIGNIFICANT CHANGE UP (ref 1–3.3)
LYMPHOCYTES # BLD AUTO: 34.2 % — SIGNIFICANT CHANGE UP (ref 13–44)
MAGNESIUM SERPL-MCNC: 2.3 MG/DL — SIGNIFICANT CHANGE UP (ref 1.6–2.6)
MCHC RBC-ENTMCNC: 27 PG — SIGNIFICANT CHANGE UP (ref 27–34)
MCHC RBC-ENTMCNC: 33 GM/DL — SIGNIFICANT CHANGE UP (ref 32–36)
MCV RBC AUTO: 81.9 FL — SIGNIFICANT CHANGE UP (ref 80–100)
MONOCYTES # BLD AUTO: 0.5 K/UL — SIGNIFICANT CHANGE UP (ref 0–0.9)
MONOCYTES NFR BLD AUTO: 12.5 % — SIGNIFICANT CHANGE UP (ref 2–14)
NEUTROPHILS # BLD AUTO: 1.7 K/UL — LOW (ref 1.8–7.4)
NEUTROPHILS NFR BLD AUTO: 46.2 % — SIGNIFICANT CHANGE UP (ref 43–77)
PHOSPHATE SERPL-MCNC: 1.9 MG/DL — LOW (ref 2.5–4.5)
PLATELET # BLD AUTO: 186 K/UL — SIGNIFICANT CHANGE UP (ref 150–400)
POTASSIUM SERPL-MCNC: 4.2 MMOL/L — SIGNIFICANT CHANGE UP (ref 3.5–5.3)
POTASSIUM SERPL-SCNC: 4.2 MMOL/L — SIGNIFICANT CHANGE UP (ref 3.5–5.3)
RBC # BLD: 4.18 M/UL — LOW (ref 4.2–5.8)
RBC # FLD: 17.3 % — HIGH (ref 10.3–14.5)
SODIUM SERPL-SCNC: 139 MMOL/L — SIGNIFICANT CHANGE UP (ref 135–145)
TOTAL CHOLESTEROL/HDL RATIO MEASUREMENT: 4.3 RATIO — SIGNIFICANT CHANGE UP (ref 3.4–9.6)
TRIGL SERPL-MCNC: 118 MG/DL — SIGNIFICANT CHANGE UP (ref 10–149)
TSH SERPL-MCNC: 2.69 UU/ML — SIGNIFICANT CHANGE UP (ref 0.34–4.82)
VIT B12 SERPL-MCNC: 701 PG/ML — SIGNIFICANT CHANGE UP (ref 243–894)
WBC # BLD: 3.7 K/UL — LOW (ref 3.8–10.5)
WBC # FLD AUTO: 3.7 K/UL — LOW (ref 3.8–10.5)

## 2017-07-15 RX ADMIN — Medication 25 MILLIGRAM(S): at 17:02

## 2017-07-15 RX ADMIN — Medication 2: at 12:47

## 2017-07-15 RX ADMIN — Medication 100 MILLIGRAM(S): at 21:24

## 2017-07-15 RX ADMIN — Medication 100 MILLIGRAM(S): at 12:50

## 2017-07-15 RX ADMIN — Medication 25 MILLIGRAM(S): at 05:27

## 2017-07-15 RX ADMIN — Medication 2: at 16:54

## 2017-07-15 RX ADMIN — INSULIN GLARGINE 20 UNIT(S): 100 INJECTION, SOLUTION SUBCUTANEOUS at 21:24

## 2017-07-15 RX ADMIN — Medication 2: at 08:38

## 2017-07-15 RX ADMIN — CEFTRIAXONE 100 GRAM(S): 500 INJECTION, POWDER, FOR SOLUTION INTRAMUSCULAR; INTRAVENOUS at 03:20

## 2017-07-15 RX ADMIN — AZITHROMYCIN 250 MILLIGRAM(S): 500 TABLET, FILM COATED ORAL at 03:50

## 2017-07-15 RX ADMIN — LACTULOSE 10 GRAM(S): 10 SOLUTION ORAL at 08:40

## 2017-07-15 RX ADMIN — ENOXAPARIN SODIUM 40 MILLIGRAM(S): 100 INJECTION SUBCUTANEOUS at 12:47

## 2017-07-15 RX ADMIN — Medication 4: at 21:23

## 2017-07-15 RX ADMIN — ATORVASTATIN CALCIUM 80 MILLIGRAM(S): 80 TABLET, FILM COATED ORAL at 21:23

## 2017-07-15 RX ADMIN — Medication 100 MILLIGRAM(S): at 02:21

## 2017-07-15 RX ADMIN — Medication 81 MILLIGRAM(S): at 12:47

## 2017-07-16 RX ADMIN — CEFTRIAXONE 100 GRAM(S): 500 INJECTION, POWDER, FOR SOLUTION INTRAMUSCULAR; INTRAVENOUS at 03:16

## 2017-07-16 RX ADMIN — Medication 100 MILLIGRAM(S): at 11:52

## 2017-07-16 RX ADMIN — Medication 25 MILLIGRAM(S): at 17:11

## 2017-07-16 RX ADMIN — Medication 4: at 16:15

## 2017-07-16 RX ADMIN — AZITHROMYCIN 250 MILLIGRAM(S): 500 TABLET, FILM COATED ORAL at 03:16

## 2017-07-16 RX ADMIN — Medication 4: at 11:49

## 2017-07-16 RX ADMIN — ENOXAPARIN SODIUM 40 MILLIGRAM(S): 100 INJECTION SUBCUTANEOUS at 11:49

## 2017-07-16 RX ADMIN — Medication 25 MILLIGRAM(S): at 05:16

## 2017-07-16 RX ADMIN — Medication 81 MILLIGRAM(S): at 11:49

## 2017-07-16 RX ADMIN — INSULIN GLARGINE 20 UNIT(S): 100 INJECTION, SOLUTION SUBCUTANEOUS at 21:32

## 2017-07-16 RX ADMIN — ATORVASTATIN CALCIUM 80 MILLIGRAM(S): 80 TABLET, FILM COATED ORAL at 21:33

## 2017-07-16 RX ADMIN — Medication 2: at 07:59

## 2017-07-16 RX ADMIN — Medication 4: at 21:33

## 2017-07-16 RX ADMIN — Medication 100 MILLIGRAM(S): at 21:34

## 2017-07-17 ENCOUNTER — TRANSCRIPTION ENCOUNTER (OUTPATIENT)
Age: 52
End: 2017-07-17

## 2017-07-17 VITALS
SYSTOLIC BLOOD PRESSURE: 155 MMHG | HEART RATE: 72 BPM | TEMPERATURE: 98 F | DIASTOLIC BLOOD PRESSURE: 76 MMHG | RESPIRATION RATE: 17 BRPM | OXYGEN SATURATION: 98 %

## 2017-07-17 LAB
ALBUMIN SERPL ELPH-MCNC: 2.9 G/DL — LOW (ref 3.5–5)
ALP SERPL-CCNC: 64 U/L — SIGNIFICANT CHANGE UP (ref 40–120)
ALT FLD-CCNC: 38 U/L DA — SIGNIFICANT CHANGE UP (ref 10–60)
ANION GAP SERPL CALC-SCNC: 6 MMOL/L — SIGNIFICANT CHANGE UP (ref 5–17)
AST SERPL-CCNC: 30 U/L — SIGNIFICANT CHANGE UP (ref 10–40)
BILIRUB SERPL-MCNC: 0.2 MG/DL — SIGNIFICANT CHANGE UP (ref 0.2–1.2)
BUN SERPL-MCNC: 12 MG/DL — SIGNIFICANT CHANGE UP (ref 7–18)
CALCIUM SERPL-MCNC: 8.1 MG/DL — LOW (ref 8.4–10.5)
CHLORIDE SERPL-SCNC: 101 MMOL/L — SIGNIFICANT CHANGE UP (ref 96–108)
CO2 SERPL-SCNC: 30 MMOL/L — SIGNIFICANT CHANGE UP (ref 22–31)
CREAT SERPL-MCNC: 0.62 MG/DL — SIGNIFICANT CHANGE UP (ref 0.5–1.3)
GLUCOSE SERPL-MCNC: 311 MG/DL — HIGH (ref 70–99)
HCT VFR BLD CALC: 38.1 % — LOW (ref 39–50)
HGB BLD-MCNC: 12.6 G/DL — LOW (ref 13–17)
MAGNESIUM SERPL-MCNC: 2 MG/DL — SIGNIFICANT CHANGE UP (ref 1.6–2.6)
MCHC RBC-ENTMCNC: 27.1 PG — SIGNIFICANT CHANGE UP (ref 27–34)
MCHC RBC-ENTMCNC: 33 GM/DL — SIGNIFICANT CHANGE UP (ref 32–36)
MCV RBC AUTO: 81.9 FL — SIGNIFICANT CHANGE UP (ref 80–100)
PHOSPHATE SERPL-MCNC: 2.3 MG/DL — LOW (ref 2.5–4.5)
PLATELET # BLD AUTO: 273 K/UL — SIGNIFICANT CHANGE UP (ref 150–400)
POTASSIUM SERPL-MCNC: 4.5 MMOL/L — SIGNIFICANT CHANGE UP (ref 3.5–5.3)
POTASSIUM SERPL-SCNC: 4.5 MMOL/L — SIGNIFICANT CHANGE UP (ref 3.5–5.3)
PROT SERPL-MCNC: 7.3 G/DL — SIGNIFICANT CHANGE UP (ref 6–8.3)
RBC # BLD: 4.65 M/UL — SIGNIFICANT CHANGE UP (ref 4.2–5.8)
RBC # FLD: 16.8 % — HIGH (ref 10.3–14.5)
S PNEUM AG SER QL: SIGNIFICANT CHANGE UP
SODIUM SERPL-SCNC: 137 MMOL/L — SIGNIFICANT CHANGE UP (ref 135–145)
WBC # BLD: 4.5 K/UL — SIGNIFICANT CHANGE UP (ref 3.8–10.5)
WBC # FLD AUTO: 4.5 K/UL — SIGNIFICANT CHANGE UP (ref 3.8–10.5)

## 2017-07-17 PROCEDURE — 80048 BASIC METABOLIC PNL TOTAL CA: CPT

## 2017-07-17 PROCEDURE — 82607 VITAMIN B-12: CPT

## 2017-07-17 PROCEDURE — 85610 PROTHROMBIN TIME: CPT

## 2017-07-17 PROCEDURE — 87899 AGENT NOS ASSAY W/OPTIC: CPT

## 2017-07-17 PROCEDURE — 87449 NOS EACH ORGANISM AG IA: CPT

## 2017-07-17 PROCEDURE — 82550 ASSAY OF CK (CPK): CPT

## 2017-07-17 PROCEDURE — 99285 EMERGENCY DEPT VISIT HI MDM: CPT | Mod: 25

## 2017-07-17 PROCEDURE — 84484 ASSAY OF TROPONIN QUANT: CPT

## 2017-07-17 PROCEDURE — 74020: CPT

## 2017-07-17 PROCEDURE — 87040 BLOOD CULTURE FOR BACTERIA: CPT

## 2017-07-17 PROCEDURE — 93005 ELECTROCARDIOGRAM TRACING: CPT

## 2017-07-17 PROCEDURE — 84443 ASSAY THYROID STIM HORMONE: CPT

## 2017-07-17 PROCEDURE — 83690 ASSAY OF LIPASE: CPT

## 2017-07-17 PROCEDURE — 80074 ACUTE HEPATITIS PANEL: CPT

## 2017-07-17 PROCEDURE — 80053 COMPREHEN METABOLIC PANEL: CPT

## 2017-07-17 PROCEDURE — 80061 LIPID PANEL: CPT

## 2017-07-17 PROCEDURE — 84100 ASSAY OF PHOSPHORUS: CPT

## 2017-07-17 PROCEDURE — 83735 ASSAY OF MAGNESIUM: CPT

## 2017-07-17 PROCEDURE — 82746 ASSAY OF FOLIC ACID SERUM: CPT

## 2017-07-17 PROCEDURE — 82553 CREATINE MB FRACTION: CPT

## 2017-07-17 PROCEDURE — 83036 HEMOGLOBIN GLYCOSYLATED A1C: CPT

## 2017-07-17 PROCEDURE — 82306 VITAMIN D 25 HYDROXY: CPT

## 2017-07-17 PROCEDURE — 83880 ASSAY OF NATRIURETIC PEPTIDE: CPT

## 2017-07-17 PROCEDURE — 85027 COMPLETE CBC AUTOMATED: CPT

## 2017-07-17 PROCEDURE — 71046 X-RAY EXAM CHEST 2 VIEWS: CPT

## 2017-07-17 PROCEDURE — 85730 THROMBOPLASTIN TIME PARTIAL: CPT

## 2017-07-17 RX ORDER — CEFUROXIME AXETIL 250 MG
1 TABLET ORAL
Qty: 14 | Refills: 0
Start: 2017-07-17 | End: 2017-07-24

## 2017-07-17 RX ORDER — SODIUM,POTASSIUM PHOSPHATES 278-250MG
1 POWDER IN PACKET (EA) ORAL
Qty: 0 | Refills: 0 | Status: DISCONTINUED | OUTPATIENT
Start: 2017-07-17 | End: 2017-07-17

## 2017-07-17 RX ADMIN — AZITHROMYCIN 250 MILLIGRAM(S): 500 TABLET, FILM COATED ORAL at 03:08

## 2017-07-17 RX ADMIN — Medication 25 MILLIGRAM(S): at 05:47

## 2017-07-17 RX ADMIN — Medication 100 MILLIGRAM(S): at 12:19

## 2017-07-17 RX ADMIN — ENOXAPARIN SODIUM 40 MILLIGRAM(S): 100 INJECTION SUBCUTANEOUS at 12:16

## 2017-07-17 RX ADMIN — Medication 8: at 12:16

## 2017-07-17 RX ADMIN — Medication 81 MILLIGRAM(S): at 12:17

## 2017-07-17 RX ADMIN — CEFTRIAXONE 100 GRAM(S): 500 INJECTION, POWDER, FOR SOLUTION INTRAMUSCULAR; INTRAVENOUS at 03:08

## 2017-07-17 RX ADMIN — Medication 1 TABLET(S): at 13:21

## 2017-07-17 NOTE — DISCHARGE NOTE ADULT - PLAN OF CARE
RESOLUTION OF SYMPTOMS please take your  medication as prescribed and follow up with PMD within  2 weeks of discharge   c/w cefetin 250 mg bid x7days   -please repeat chest x-ray  within 3-4 weeks of discharge 130/90 -Please take your medications as prescribed and follow up with PMD within 2 weeks of discharge   -c/w DASH diet , diet rich in fruits and vegetables and low in sodium { salt }  -recommend moderate exercise 5 days /week for 30 minutes hba1c<7 please take your medications as prescribed and follow up with PMD within 2 weeks of discharge  -please eat low carbohydrate diet please take your medications as prescribed and follow up with PMD within 2 weeks of discharge please take your medications as prescribed and follow up with PMD within 2 weeks of discharge  f/u with heme-onc  to restart the medications

## 2017-07-17 NOTE — DISCHARGE NOTE ADULT - MEDICATION SUMMARY - MEDICATIONS TO TAKE
I will START or STAY ON the medications listed below when I get home from the hospital:    aspirin 81 mg oral delayed release tablet  -- 1 tab(s) by mouth once a day  -- Indication: For Need for prophylactic measure    lisinopril 2.5 mg oral tablet  -- 1 tab(s) by mouth once a day  -- Indication: For htn    glipiZIDE 5 mg oral tablet  -- 1 cap(s) by mouth 3 times a day  -- Indication: For DM (diabetes mellitus)    Farxiga 5 mg oral tablet  -- 1 tab(s) by mouth once a day  -- Indication: For DM (diabetes mellitus)    atorvastatin 40 mg oral tablet  -- 1 tab(s) by mouth once a day  -- Indication: For hld    atorvastatin 80 mg oral tablet  -- 1 tab(s) by mouth once a day (at bedtime)  -- Indication: For hld    Sprycel 80 mg oral tablet  -- 1 tab(s) by mouth once a day  -- Indication: For CML (chronic myelocytic leukemia)    metoprolol tartrate 25 mg oral tablet  -- 1 tab(s) by mouth 3 times a day  -- Indication: For htn    metoprolol tartrate 25 mg oral tablet  -- 1 tab(s) by mouth 3 times a day  -- Indication: For htn    Ceftin 250 mg oral tablet  -- 1 tab(s) by mouth 2 times a day  -- Finish all this medication unless otherwise directed by prescriber.  Medication should be taken with plenty of water.  Take with food or milk.    -- Indication: For Pneumonia

## 2017-07-17 NOTE — PROGRESS NOTE ADULT - SUBJECTIVE AND OBJECTIVE BOX
Patient is a 52y old  Male who presents with a chief complaint of Vomiting multiple times onset 11:40 PM (14 Jul 2017 05:24)      INTERVAL HPI/OVERNIGHT EVENTS: patient sitting in the chair and patient feeling better and denies any active complaints of fever and expresses desire to go home     MEDICATIONS  (STANDING):  azithromycin  IVPB   IV Intermittent   cefTRIAXone   IVPB   IV Intermittent   azithromycin  IVPB 500 milliGRAM(s) IV Intermittent every 24 hours  cefTRIAXone   IVPB 1 Gram(s) IV Intermittent every 24 hours  aspirin enteric coated 81 milliGRAM(s) Oral daily  atorvastatin 80 milliGRAM(s) Oral at bedtime  metoprolol 25 milliGRAM(s) Oral two times a day  sodium chloride 0.9%. 1000 milliLiter(s) (75 mL/Hr) IV Continuous <Continuous>  insulin glargine Injectable (LANTUS) 20 Unit(s) SubCutaneous at bedtime  insulin lispro (HumaLOG) corrective regimen sliding scale   SubCutaneous Before meals and at bedtime  dextrose 5%. 1000 milliLiter(s) (50 mL/Hr) IV Continuous <Continuous>  dextrose 50% Injectable 12.5 Gram(s) IV Push once  dextrose 50% Injectable 25 Gram(s) IV Push once  dextrose 50% Injectable 25 Gram(s) IV Push once  enoxaparin Injectable 40 milliGRAM(s) SubCutaneous daily  senna 2 Tablet(s) Oral at bedtime  potassium acid phosphate/sodium acid phosphate tablet (K-PHOS No. 2) 1 Tablet(s) Oral four times a day with meals    MEDICATIONS  (PRN):  dextrose Gel 1 Dose(s) Oral once PRN Blood Glucose LESS THAN 70 milliGRAM(s)/deciLiter  glucagon  Injectable 1 milliGRAM(s) IntraMuscular once PRN Glucose <70 milliGRAM(s)/deciLiter  ondansetron Injectable 4 milliGRAM(s) IV Push every 6 hours PRN Nausea and/or Vomiting  guaiFENesin    Syrup 100 milliGRAM(s) Oral every 6 hours PRN Cough  docusate sodium 100 milliGRAM(s) Oral three times a day PRN Constipation  lactulose Syrup 10 Gram(s) Oral daily PRN constipation        Vital Signs Last 24 Hrs  T(C): 36.5 (17 Jul 2017 05:05), Max: 36.6 (16 Jul 2017 20:25)  T(F): 97.7 (17 Jul 2017 05:05), Max: 97.9 (16 Jul 2017 20:25)  HR: 68 (17 Jul 2017 05:05) (66 - 83)  BP: 154/85 (17 Jul 2017 05:05) (119/65 - 154/85)  BP(mean): --  RR: 16 (17 Jul 2017 05:05) (16 - 16)  SpO2: 96% (17 Jul 2017 05:05) (96% - 99%)    PHYSICAL EXAM:  GENERAL: NAD, well-groomed, well-developed  HEAD:  Atraumatic, Normocephalic  NECK: Supple, No JVD  CHEST/LUNG: Clear to percussion bilaterally; No rales, rhonchi, wheezing, or rubs  HEART: Regular rate and rhythm; No murmurs, rubs, or gallops  ABDOMEN: Soft, Nontender, Nondistended; Bowel sounds present  NERVOUS SYSTEM:  Alert & Oriented X3, Good concentration; Motor Strength 5/5 B/L   EXTREMITIES:  2+ Peripheral Pulses, No clubbing, cyanosis, or edema  SKIN;                CAPILLARY BLOOD GLUCOSE  141 (17 Jul 2017 07:54)  214 (16 Jul 2017 20:53)  242 (16 Jul 2017 15:39)  211 (16 Jul 2017 11:21)

## 2017-07-17 NOTE — DISCHARGE NOTE ADULT - HOSPITAL COURSE
52 years old male from home, lives with family, with PMH of CML(on dasamanitib ), DM last Hba1c 10.8, on insulin, HLD, HTN, and CAD s/p CABG(3/17/2017, quadruple bypass) presented to ED c/o multiple times of vomiting and abdominal pian after taking levaquin {Patient has been following up hematologist Dr. Che Contreras for CML, taking medication for CML daily, however since 15 days ago he has had dry cough so he went to see Dr. Contreras who recommended CXR and stop taking cancer medication. CXR showed pneumonia, so Dr. Contreras called him and prescribed Levaquin 750mg. After taking 1 pill of Levaquin at home patient started vomiting and he came to [facility]  patient had a CXR-New infiltrate right upper lung.  BLOOD CULTURES X2 negative and urine legionella -negative  and patient afberile and patient received i.v antibiotics for 4 days and patient antibiotics changed to PO and patient has no more vomiting and abdominal x-ray rules out obstruction and patient hba1c -7.3 and will continue current home medications and recommend follow up with PMD   PATIENT IS NOW STABLE FOR DISCHARGE AND PATIENT SHOULD FOLLOW UP WITH pcp AND HEME-ONC FOR CONTINUATION OF MEDICATIONS

## 2017-07-17 NOTE — PROGRESS NOTE ADULT - PROBLEM SELECTOR PLAN 1
likely community acquired pneumonia   -CXR evidence of RUL infiltration  -Robitussin PRN for cough  -Continue Rocephin and Zithromax for suspected CAP

## 2017-07-17 NOTE — PROGRESS NOTE ADULT - PROBLEM SELECTOR PLAN 6
-IMPROVE score 2(current cancer), will start Lovenox 40mg subcu daily for DVT prophylaxis.      DISPO: D/C PLANNING IN 24 HOURS AND WILL C/W PO ANTIBIOTICS AS OUT-PATIENT

## 2017-07-17 NOTE — PROGRESS NOTE ADULT - PROBLEM SELECTOR PLAN 4
-h/o Uncontrolled DM, starting Lantus 20U + moderate sliding scale, consistent carbohydrate diet  -will add humalog 2 units for now  [id number]

## 2017-07-17 NOTE — DISCHARGE NOTE ADULT - PATIENT PORTAL LINK FT
“You can access the [facility] Patient Portal, offered by [facility], by registering with the following website: [email or web address]”

## 2017-07-17 NOTE — DISCHARGE NOTE ADULT - SECONDARY DIAGNOSIS.
Essential hypertension DM (diabetes mellitus) HLD (hyperlipidemia) CML (chronic myelocytic leukemia)

## 2017-07-17 NOTE — DISCHARGE NOTE ADULT - CARE PLAN
Principal Discharge DX:	PNA (pneumonia)  Goal:	RESOLUTION OF SYMPTOMS  Instructions for follow-up, activity and diet:	please take your  medication as prescribed and follow up with PMD within  2 weeks of discharge   c/w cefetin 250 mg bid x7days   -please repeat chest x-ray  within 3-4 weeks of discharge  Secondary Diagnosis:	Essential hypertension  Goal:	130/90  Instructions for follow-up, activity and diet:	-Please take your medications as prescribed and follow up with PMD within 2 weeks of discharge   -c/w DASH diet , diet rich in fruits and vegetables and low in sodium { salt }  -recommend moderate exercise 5 days /week for 30 minutes  Secondary Diagnosis:	DM (diabetes mellitus)  Goal:	hba1c<7  Instructions for follow-up, activity and diet:	please take your medications as prescribed and follow up with PMD within 2 weeks of discharge  -please eat low carbohydrate diet  Secondary Diagnosis:	HLD (hyperlipidemia)  Instructions for follow-up, activity and diet:	please take your medications as prescribed and follow up with PMD within 2 weeks of discharge  Secondary Diagnosis:	CML (chronic myelocytic leukemia)  Instructions for follow-up, activity and diet:	please take your medications as prescribed and follow up with PMD within 2 weeks of discharge  f/u with heme-onc  to restart the medications

## 2017-07-19 DIAGNOSIS — Z79.82 LONG TERM (CURRENT) USE OF ASPIRIN: ICD-10-CM

## 2017-07-19 DIAGNOSIS — C92.10 CHRONIC MYELOID LEUKEMIA, BCR/ABL-POSITIVE, NOT HAVING ACHIEVED REMISSION: ICD-10-CM

## 2017-07-19 DIAGNOSIS — Z79.4 LONG TERM (CURRENT) USE OF INSULIN: ICD-10-CM

## 2017-07-19 DIAGNOSIS — E11.9 TYPE 2 DIABETES MELLITUS WITHOUT COMPLICATIONS: ICD-10-CM

## 2017-07-19 DIAGNOSIS — Z95.5 PRESENCE OF CORONARY ANGIOPLASTY IMPLANT AND GRAFT: ICD-10-CM

## 2017-07-19 DIAGNOSIS — Z82.3 FAMILY HISTORY OF STROKE: ICD-10-CM
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
## 2017-07-19 DIAGNOSIS — J18.9 PNEUMONIA, UNSPECIFIED ORGANISM: ICD-10-CM

## 2017-07-19 DIAGNOSIS — I25.810 ATHEROSCLEROSIS OF CORONARY ARTERY BYPASS GRAFT(S) WITHOUT ANGINA PECTORIS: ICD-10-CM

## 2017-07-19 DIAGNOSIS — R11.10 VOMITING, UNSPECIFIED: ICD-10-CM

## 2017-07-19 DIAGNOSIS — T37.8X5A ADVERSE EFFECT OF OTHER SPECIFIED SYSTEMIC ANTI-INFECTIVES AND ANTIPARASITICS, INITIAL ENCOUNTER: ICD-10-CM

## 2017-07-19 DIAGNOSIS — E78.5 HYPERLIPIDEMIA, UNSPECIFIED: ICD-10-CM

## 2017-07-19 DIAGNOSIS — M10.9 GOUT, UNSPECIFIED: ICD-10-CM

## 2017-07-19 DIAGNOSIS — I10 ESSENTIAL (PRIMARY) HYPERTENSION: ICD-10-CM

## 2017-07-19 LAB
CULTURE RESULTS: SIGNIFICANT CHANGE UP
CULTURE RESULTS: SIGNIFICANT CHANGE UP
SPECIMEN SOURCE: SIGNIFICANT CHANGE UP
SPECIMEN SOURCE: SIGNIFICANT CHANGE UP

## 2017-07-20 DIAGNOSIS — I25.10 ATHEROSCLEROTIC HEART DISEASE OF NATIVE CORONARY ARTERY WITHOUT ANGINA PECTORIS: ICD-10-CM

## 2018-08-15 NOTE — H&P ADULT. - EXTREMITIES COMMENTS
inhaler Inhale 2 puffs into the lungs every 6 hours as needed for Wheezing 8/14/16  Yes Harley Villalobos MD   clonazePAM (KLONOPIN) 0.5 MG tablet Take 1 tablet by mouth 2 times daily as needed for Anxiety  Patient taking differently: Take 0.5 mg by mouth 3 times daily as needed for Anxiety  7/15/16  Yes Malka Perea MD   naproxen (NAPROSYN) 500 MG tablet Take 1 tablet by mouth 2 times daily for 7 days 5/4/18 5/11/18  Antonio Bassett PA-C   albuterol sulfate HFA (PROVENTIL HFA) 108 (90 Base) MCG/ACT inhaler Inhale 2 puffs into the lungs every 6 hours as needed for Wheezing 4/27/18   VELMA Cano CNP   ibuprofen (ADVIL;MOTRIN) 800 MG tablet Take 1 tablet by mouth every 6 hours as needed for Pain 3/5/18 3/10/18  Юлия Major PA-C   mirtazapine (REMERON) 15 MG tablet Take 15 mg by mouth nightly    Historical Provider, MD   medroxyPROGESTERone (PROVERA) 10 MG tablet Take 1 tablet by mouth daily for 6 days 2/19/18 2/25/18  Elisa Schwarz MD   OXcarbazepine (TRILEPTAL) 300 MG tablet Take 300 mg by mouth nightly    Historical Provider, MD       No future appointments.    and   COPD Assessment    Does the patient understand envrionmental exposure?:  Yes  Is the patient able to verbalize Rescue vs. Long Acting medications?:  Yes  Does the patient have a nebulizer?:  Yes  Does the patient use a space with inhaled medications?:  No     Other symptoms causing concern         Symptoms:   COPD associated wheezing: Pos      Symptom course:  stable  Breathlessness:  exertion  Increase use of rapid acting/rescue inhaled medications?:  Yes  Change in chronic cough?:  No/At Baseline  Change in sputum?:  No/At Baseline  Sputum characteristics:  Unable to Specify  Self Monitoring - SaO2:  No
trace pitting edema

## 2018-08-20 PROBLEM — E78.5 HYPERLIPIDEMIA, UNSPECIFIED: Chronic | Status: ACTIVE | Noted: 2017-08-11

## 2018-08-20 PROBLEM — C92.10 CHRONIC MYELOID LEUKEMIA, BCR/ABL-POSITIVE, NOT HAVING ACHIEVED REMISSION: Chronic | Status: ACTIVE | Noted: 2017-03-15

## 2018-08-20 PROBLEM — M10.9 GOUT, UNSPECIFIED: Chronic | Status: ACTIVE | Noted: 2017-03-15

## 2018-08-20 PROBLEM — I25.10 ATHEROSCLEROTIC HEART DISEASE OF NATIVE CORONARY ARTERY WITHOUT ANGINA PECTORIS: Chronic | Status: ACTIVE | Noted: 2017-08-11

## 2018-08-20 PROBLEM — E11.9 TYPE 2 DIABETES MELLITUS WITHOUT COMPLICATIONS: Chronic | Status: ACTIVE | Noted: 2017-03-15

## 2018-08-20 PROBLEM — I10 ESSENTIAL (PRIMARY) HYPERTENSION: Chronic | Status: ACTIVE | Noted: 2017-03-15

## 2018-08-20 PROBLEM — J18.9 PNEUMONIA, UNSPECIFIED ORGANISM: Chronic | Status: ACTIVE | Noted: 2017-08-11

## 2018-09-19 NOTE — ED ADULT TRIAGE NOTE - CHIEF COMPLAINT QUOTE
MRN:[id number]                      After Visit Summary   9/19/2018    Romel [place]    MRN: [id number]           Thank you!     Thank you for choosing [facility] for your care. Our goal is always to provide you with excellent care. Hearing back from our patients is one way we can continue to improve our services. Please take a few minutes to complete the written survey that you may receive in the mail after you visit with us. Thank you!        Patient Information     Date Of Birth          1940        About your hospital stay     You were admitted on:  September 19, 2018 You last received care in the:  [facility] PreOP/Phase II    You were discharged on:  September 19, 2018       Who to Call     For medical emergencies, please call 911.  For non-urgent questions about your medical care, please call your primary care provider or clinic, [phone]  For questions related to your surgery, please call your surgery clinic        Attending Provider     Provider Specialty    Ti Gentile MD Urology       Primary Care Provider Office Phone # Fax #    Zac Martínez [phone]      Further instructions from your care team       Call when urine clear, our office will remove the bustamante catheter  Start Cipro tonight with evening meal        Do not restart your brilinta blood thinner medication until your urine is clear- yellow and your bustamante cathater has been removed       Same Day Surgery Discharge Instructions for  Sedation and General Anesthesia       It's not unusual to feel dizzy, light-headed or faint for up to 24 hours after surgery or while taking pain medication.  If you have these symptoms: sit for a few minutes before standing and have someone assist you when you get up to walk or use the bathroom.      You should rest and relax for the next 24 hours. We recommend you make arrangements to have an adult stay with you for at least 24 hours after your  c/o nausea/vomiting discharge.  Avoid hazardous and strenuous activity.      DO NOT DRIVE any vehicle or operate mechanical equipment for 24 hours following the end of your surgery.  Even though you may feel normal, your reactions may be affected by the medication you have received.      Do not drink alcoholic beverages for 24 hours following surgery.       Slowly progress to your regular diet as you feel able. It's not unusual to feel nauseated and/or vomit after receiving anesthesia.  If you develop these symptoms, drink clear liquids (apple juice, ginger ale, broth, 7-up, etc. ) until you feel better.  If your nausea and vomiting persists for 24 hours, please notify your surgeon.        All narcotic pain medications, along with inactivity and anesthesia, can cause constipation. Drinking plenty of liquids and increasing fiber intake will help.      For any questions of a medical nature, call your surgeon.      Do not make important decisions for 24 hours.      If you had general anesthesia, you may have a sore throat for a couple of days related to the breathing tube used during surgery.  You may use Cepacol lozenges to help with this discomfort.  If it worsens or if you develop a fever, contact your surgeon.       If you feel your pain is not well managed with the pain medications prescribed by your surgeon, please contact your surgeon's office to let them know so they can address your concerns.       Cystoscopy Discharge Instructions      Diet:    Return to the diet that you were on before the procedure, unless you are given specific diet instructions.    It is important to drink 6-8 glasses of fluids per day at home - at least 3-4 glasses should be water.    Activity:    Walk short distances and increase as your strength allows.    You may climb stairs.    Do not do strenuous exercise or heavy lifting until approved by surgeon.    Do not drive while taking narcotic pain medications.    Bathing:    You may take a shower.    Call  your physician if these signs/symptoms are present:    Pain that is not relieved by a short rest or ordered pain medications.    Temperature at or above 101.0 F or chills.    Inability or difficulty urinating.  Excessive blood in urine.    Any questions or concerns.    DISCHARGE INSTRUCTIONS FOR   CATHETER CARE AT HOME      .      Basic Catheter Care  1. Always wash hands before and after handling your catheter.  2. Use soap and water to wash the area around your catheter.  3. Do this procedure twice a day.  4. Proper cleansing will help keep the area from becoming irritated or infected.    Leg Bag  This is a small plastic bag that collects urine draining from your catheter and then strapped around your thigh. It will need to be emptied when the bag is 1/2 to 3/4 full.     Large Drainage Bag  1. This bag is larger than the leg bag and holds more urine.  It is to be used while at home, especially at night.    2. Before you go to bed, change the leg bag to the large drainage bag.  3. Pinch off the catheter with your fingers and swab the connection between the catheter and leg bag with alcohol sponge.  4. Disconnect the leg bag and connect the large drainage bag to your catheter.  5. When you get into bed, arrange the drainage tubing so that it doesn t kink.  6. Be sure to keep the bag below the level of your bladder and allow enough slack for turning.    Cleaning Your Drainage Bags    1. Wash hands.  2. Using funnel or syringe, fill the bag half full with a solution of 1/2  vinegar and 1/2 water.  3. Shake bag, allowing mixture to cleanse inside of bag.  4. Empty out all vinegar and water mixture from your bag.  5. Hang bag to dry when not in use.  6. Clean your bags anytime you change them.      Helpful Hints  1. Always keep drainage bags below bladder level to insure adequate drainage.  2. Drink 4-6 glasses of water daily along with other fluids you normally drink to keep urine free of infection and / or  "clots.  3. If you notice no urine in your bag for 2 to 4 hours or you develop extreme discomfort in bladder area, your catheter maybe plugged.  Notify your doctor.  4. If you notice your urine becomes foul smelling and cloudy, notify your doctor.  Also notify your doctor if you develop fever or chills.  5. If you notice urine leaking around the outside of the catheter, check to be sure catheter or tubing is not kinked.  6. Don t use leg bag while in bed.      **If you have questions or concerns about your procedure,  call Dr. Gentile at [phone]**    Pending Results     No orders found from 9/17/2018 to 9/20/2018.            Admission Information     Date & Time Provider Department Dept. Phone    9/19/2018 Ti Gentile MD [facility] PreOP/Phase -[phone]      Your Vitals Were     Blood Pressure Temperature Respirations Height Weight Pulse Oximetry    156/94 97.2  F (36.2  C) (Temporal) 14 1.803 m (5' 11\") 103 kg (227 lb) 93%    BMI (Body Mass Index)                   31.66 kg/m2           MyChart Information     [employer] gives you secure access to your electronic health record. If you see a primary care provider, you can also send messages to your care team and make appointments. If you have questions, please call your primary care clinic.  If you do not have a primary care provider, please call [phone] and they will assist you.        Care EveryWhere ID     This is your Care EveryWhere ID. This could be used by other organizations to access your [facility] medical records  [id number]        Equal Access to Services     [facility]: Hadii aad guero hadasho Soomaali, waaxda luqadaha, qaybta kaalmada blanca patterson. So [facility] [phone].    ATENCIÓN: Si habla español, tiene a deshpande disposición servicios gratuitos de asistencia lingüística. Llame al [phone].    We comply with applicable federal civil rights laws and [place] laws. We do not discriminate " on the basis of race, color, national origin, age, disability, sex, sexual orientation, or gender identity.               Review of your medicines      START taking        Dose / Directions    ciprofloxacin 500 MG tablet   Commonly known as:  CIPRO   Used for:  Gross hematuria        Dose:  500 mg   Take 1 tablet (500 mg) by mouth 2 times daily   Quantity:  6 tablet   Refills:  0         CONTINUE these medicines which have NOT CHANGED        Dose / Directions    atorvastatin 40 MG tablet   Commonly known as:  LIPITOR   Used for:  Coronary artery disease involving native coronary artery of native heart with other form of angina pectoris (H), Essential hypertension, benign        Dose:  40 mg   Take 1 tablet (40 mg) by mouth At Bedtime   Quantity:  90 tablet   Refills:  3       cetirizine 10 MG tablet   Commonly known as:  zyrTEC        Dose:  10 mg   Take 1 tablet (10 mg) by mouth every evening   Quantity:  30 tablet   Refills:  1       cinnamon 500 MG Caps        Dose:  1 capsule   Take 1 capsule by mouth daily   Refills:  0       fluticasone 100 MCG/BLIST Aepb   Commonly known as:  FLOVENT DISKUS   Used for:  Mild persistent asthma without complication        Dose:  1 puff   Inhale 1 puff into the lungs 2 times daily   Quantity:  3 Inhaler   Refills:  3       fluticasone 50 MCG/ACT spray   Commonly known as:  FLONASE   Used for:  Raspy voice, Congestion of paranasal sinus        Dose:  1-2 spray   Spray 1-2 sprays into both nostrils daily   Quantity:  1 Bottle   Refills:  3       MUCINEX 600 MG 12 hr tablet   Generic drug:  guaiFENesin        Dose:  600 mg   Take 1 tablet (600 mg) by mouth 2 times daily   Quantity:  20 tablet   Refills:  0       nitroGLYcerin 0.4 MG sublingual tablet   Commonly known as:  NITROSTAT   Used for:  Postsurgical percutaneous transluminal coronary angioplasty status, Coronary artery disease involving native coronary artery of native heart with angina pectoris (H), Status post coronary  angioplasty        Dose:  0.4 mg   Place 1 tablet (0.4 mg) under the tongue every 5 minutes as needed for chest pain if you are still having symptoms after 3 doses (15 minutes) call 911.   Quantity:  25 tablet   Refills:  1       PROAIR  (90 Base) MCG/ACT inhaler   Used for:  Chronic obstructive pulmonary disease, unspecified COPD type (H)   Generic drug:  albuterol        INHALE 2 PUFFS INTO THE LUNGS EVERY 4 HOURS AS NEEDED FOR SHORTNESS OF BREATH OR DIFFICULT BREATHING OR WHEEZING   Quantity:  8.5 g   Refills:  1       TYLENOL PO        Take by mouth every 6 hours as needed for mild pain or fever   Refills:  0         STOP taking     MULTIVITAMIN TABS   OR           ticagrelor 90 MG tablet   Commonly known as:  BRILINTA           Turmeric 500 MG Tabs                Where to get your medicines      These medications were sent to [facility] [place], [place] - [address]  [address], [place] [address]     Phone:  [phone]     ciprofloxacin 500 MG tablet                Protect others around you: Learn how to safely use, store and throw away your medicines at www.disposemymeds.org.        ANTIBIOTIC INSTRUCTION     You've Been Prescribed an Antibiotic - Now What?  Your healthcare team thinks that you or your loved one might have an infection. Some infections can be treated with antibiotics, which are powerful, life-saving drugs. Like all medications, antibiotics have side effects and should only be used when necessary. There are some important things you should know about your antibiotic treatment.      Your healthcare team may run tests before you start taking an antibiotic.    Your team may take samples (e.g., from your blood, urine or other areas) to run tests to look for bacteria. These test can be important to determine if you need an antibiotic at all and, if you do, which antibiotic will work best.      Within a few days, your healthcare team might change or even stop  your antibiotic.    Your team may start you on an antibiotic while they are working to find out what is making you sick.    Your team might change your antibiotic because test results show that a different antibiotic would be better to treat your infection.    In some cases, once your team has more information, they learn that you do not need an antibiotic at all. They may find out that you don't have an infection, or that the antibiotic you're taking won't work against your infection. For example, an infection caused by a virus can't be treated with antibiotics. Staying on an antibiotic when you don't need it is more likely to be harmful than helpful.      You may experience side effects from your antibiotic.    Like all medications, antibiotics have side effects. Some of these can be serious.    Let you healthcare team know if you have any known allergies when you are admitted to the hospital.    One significant side effect of nearly all antibiotics is the risk of severe and sometimes deadly diarrhea caused by Clostridium difficile (C. Difficile). This occurs when a person takes antibiotics because some good germs are destroyed. Antibiotic use allows C. diificile to take over, putting patients at high risk for this serious infection.    As a patient or caregiver, it is important to understand your or your loved one's antibiotic treatment. It is especially important for caregivers to speak up when patients can't speak for themselves. Here are some important questions to ask your healthcare team.    What infection is this antibiotic treating and how do you know I have that infection?    What side effects might occur from this antibiotic?    How long will I need to take this antibiotic?    Is it safe to take this antibiotic with other medications or supplements (e.g., vitamins) that I am taking?     Are there any special directions I need to know about taking this antibiotic? For example, should I take it with  food?    How will I be monitored to know whether my infection is responding to the antibiotic?    What tests may help to make sure the right antibiotic is prescribed for me?      Information provided by:  www.cdc.gov/getsmart  U.S. Department of Health and Human Services  Centers for disease Control and Prevention  National Center for Emerging and Zoonotic Infectious Diseases  Division of Healthcare Quality Promotion             Medication List: This is a list of all your medications and when to take them. Check marks below indicate your daily home schedule. Keep this list as a reference.      Medications           Morning Afternoon Evening Bedtime As Needed    atorvastatin 40 MG tablet   Commonly known as:  LIPITOR   Take 1 tablet (40 mg) by mouth At Bedtime                                cetirizine 10 MG tablet   Commonly known as:  zyrTEC   Take 1 tablet (10 mg) by mouth every evening                                cinnamon 500 MG Caps   Take 1 capsule by mouth daily                                ciprofloxacin 500 MG tablet   Commonly known as:  CIPRO   Take 1 tablet (500 mg) by mouth 2 times daily                                fluticasone 100 MCG/BLIST Aepb   Commonly known as:  FLOVENT DISKUS   Inhale 1 puff into the lungs 2 times daily                                fluticasone 50 MCG/ACT spray   Commonly known as:  FLONASE   Spray 1-2 sprays into both nostrils daily                                MUCINEX 600 MG 12 hr tablet   Take 1 tablet (600 mg) by mouth 2 times daily   Generic drug:  guaiFENesin                                nitroGLYcerin 0.4 MG sublingual tablet   Commonly known as:  NITROSTAT   Place 1 tablet (0.4 mg) under the tongue every 5 minutes as needed for chest pain if you are still having symptoms after 3 doses (15 minutes) call 911.                                PROAIR  (90 Base) MCG/ACT inhaler   INHALE 2 PUFFS INTO THE LUNGS EVERY 4 HOURS AS NEEDED FOR SHORTNESS OF BREATH OR  DIFFICULT BREATHING OR WHEEZING   Generic drug:  albuterol                                TYLENOL PO   Take by mouth every 6 hours as needed for mild pain or fever

## 2018-10-16 ENCOUNTER — APPOINTMENT (OUTPATIENT)
Dept: GASTROENTEROLOGY | Facility: CLINIC | Age: 53
End: 2018-10-16
Payer: COMMERCIAL

## 2018-10-16 VITALS
BODY MASS INDEX: 27.11 KG/M2 | DIASTOLIC BLOOD PRESSURE: 65 MMHG | SYSTOLIC BLOOD PRESSURE: 145 MMHG | TEMPERATURE: 97.8 F | HEART RATE: 80 BPM | WEIGHT: 183 LBS | OXYGEN SATURATION: 97 % | RESPIRATION RATE: 16 BRPM | HEIGHT: 69 IN

## 2018-10-16 DIAGNOSIS — G62.9 POLYNEUROPATHY, UNSPECIFIED: ICD-10-CM

## 2018-10-16 PROCEDURE — 99203 OFFICE O/P NEW LOW 30 MIN: CPT

## 2018-10-16 RX ORDER — GABAPENTIN 100 MG/1
100 CAPSULE ORAL 3 TIMES DAILY
Qty: 42 | Refills: 1 | Status: ACTIVE | COMMUNITY
Start: 2018-10-16 | End: 1900-01-01

## 2019-01-24 ENCOUNTER — APPOINTMENT (OUTPATIENT)
Dept: GASTROENTEROLOGY | Facility: CLINIC | Age: 54
End: 2019-01-24
Payer: COMMERCIAL

## 2019-01-24 VITALS
RESPIRATION RATE: 16 BRPM | DIASTOLIC BLOOD PRESSURE: 74 MMHG | SYSTOLIC BLOOD PRESSURE: 132 MMHG | WEIGHT: 176 LBS | BODY MASS INDEX: 26.07 KG/M2 | HEIGHT: 69 IN | TEMPERATURE: 98.5 F | OXYGEN SATURATION: 98 % | HEART RATE: 92 BPM

## 2019-01-24 DIAGNOSIS — A04.8 OTHER SPECIFIED BACTERIAL INTESTINAL INFECTIONS: ICD-10-CM

## 2019-01-24 PROCEDURE — 99213 OFFICE O/P EST LOW 20 MIN: CPT

## 2019-01-24 RX ORDER — OMEPRAZOLE 20 MG/1
20 CAPSULE, DELAYED RELEASE ORAL TWICE DAILY
Qty: 28 | Refills: 0 | Status: ACTIVE | COMMUNITY
Start: 2019-01-24 | End: 1900-01-01

## 2019-01-24 RX ORDER — METRONIDAZOLE 500 MG/1
500 TABLET ORAL EVERY 8 HOURS
Qty: 42 | Refills: 0 | Status: ACTIVE | COMMUNITY
Start: 2019-01-24 | End: 1900-01-01

## 2019-01-24 RX ORDER — AMOXICILLIN 500 MG/1
500 CAPSULE ORAL TWICE DAILY
Qty: 56 | Refills: 0 | Status: ACTIVE | COMMUNITY
Start: 2019-01-24 | End: 1900-01-01

## 2019-01-24 NOTE — PHYSICAL EXAM
[General Appearance - Alert] : alert [General Appearance - In No Acute Distress] : in no acute distress [General Appearance - Well Nourished] : well nourished [Sclera] : the sclera and conjunctiva were normal [PERRL With Normal Accommodation] : pupils were equal in size, round, and reactive to light [Extraocular Movements] : extraocular movements were intact [Optic Disc Abnormality] : the optic disc were normal in size and color [Outer Ear] : the ears and nose were normal in appearance [Hearing Threshold Finger Rub Not Dickens] : hearing was normal [Examination Of The Oral Cavity] : the lips and gums were normal [Both Tympanic Membranes Were Examined] : both tympanic membranes were normal [Neck Appearance] : the appearance of the neck was normal [Neck Cervical Mass (___cm)] : no neck mass was observed [Jugular Venous Distention Increased] : there was no jugular-venous distention [Thyroid Diffuse Enlargement] : the thyroid was not enlarged [Thyroid Nodule] : there were no palpable thyroid nodules [Respiration, Rhythm And Depth] : normal respiratory rhythm and effort [Exaggerated Use Of Accessory Muscles For Inspiration] : no accessory muscle use [Arterial Pulses Carotid] : carotid pulses were normal with no bruits [Bowel Sounds] : normal bowel sounds [Abdomen Soft] : soft [Abdomen Tenderness] : non-tender [] : no hepato-splenomegaly [Abdomen Mass (___ Cm)] : no abdominal mass palpated [Cervical Lymph Nodes Enlarged Posterior Bilaterally] : posterior cervical [No CVA Tenderness] : no ~M costovertebral angle tenderness [No Spinal Tenderness] : no spinal tenderness [Abnormal Walk] : normal gait [Musculoskeletal - Swelling] : no joint swelling seen [Motor Tone] : muscle strength and tone were normal [Skin Color & Pigmentation] : normal skin color and pigmentation [Cranial Nerves] : cranial nerves 2-12 were intact [Deep Tendon Reflexes (DTR)] : deep tendon reflexes were 2+ and symmetric [Sensation] : the sensory exam was normal to light touch and pinprick [Motor Exam] : the motor exam was normal [Oriented To Time, Place, And Person] : oriented to person, place, and time [Impaired Insight] : insight and judgment were intact [Affect] : the affect was normal

## 2019-02-19 NOTE — REVIEW OF SYSTEMS
[Abdominal Pain] : abdominal pain [Constipation] : constipation [Heartburn] : heartburn [Negative] : Heme/Lymph [Vomiting] : no vomiting [Diarrhea] : no diarrhea [Melena] : no melena

## 2019-02-19 NOTE — HISTORY OF PRESENT ILLNESS
[___ Month(s) Ago] : [unfilled] month(s) ago [Adding Medication ___] : adding [unfilled] [None] : had no significant interval events [Heartburn] : stable heartburn [Nausea] : denies nausea [Vomiting] : denies vomiting [Diarrhea] : denies diarrhea [Abdominal Pain] : stable abdominal pain [Abdominal Swelling] : denies abdominal swelling [Rectal Pain] : denies rectal pain [GERD] : gastroesophageal reflux disease [Hiatus Hernia] : no hiatus hernia [Peptic Ulcer Disease] : no peptic ulcer disease [Pancreatitis] : no pancreatitis [Cholelithiasis] : no cholelithiasis [Kidney Stone] : no kidney stone [Inflammatory Bowel Disease] : no inflammatory bowel disease [Irritable Bowel Syndrome] : no irritable bowel syndrome [Diverticulitis] : no diverticulitis [Alcohol Abuse] : no alcohol abuse [Malignancy] : no malignancy [Abdominal Surgery] : no abdominal surgery [Appendectomy] : no appendectomy [Cholecystectomy] : no cholecystectomy [de-identified] : 53 Y/O male with pmh of HTN, HLD, Diabetes came for follow up of rt. upper abdominal pain. Patient was prescribed Neurontin and   referred to pain management . No intervention was recommended by pain management. Patient still continues to have abdominal pain. H. Pylori was positive on stool antigen test. We jennifer start patient on antibiotics against H.pylori along with PPI and if not improved will do CT enterography in next visit after 6 weeks.  [FreeTextEntry1] : 55 Y/O male with pmh of HTN, HLD, Diabetes came for follow up of rt. upper abdominal pain. Patient was prescribed Neurontin and   referred to pain management . No intervention was recommended by pain management. Patient still continues to have abdominal pain.\par H. pyolri was positive: Will start on triple regimen \par Follow after 6 weeks and if not improved will do CT enterography

## 2019-02-19 NOTE — ASSESSMENT
[FreeTextEntry1] : 55 Y/O male with pmh of HTN, HLD, Diabetes came for follow up of rt. upper abdominal pain. Patient was prescribed Neurontin and   referred to pain management . No intervention was recommended by pain management. Patient still continues to have abdominal pain.\par H. Pylori positive: Will start on triple regimen\par Follow up after 6 weeks and if pt still continues to have pain will do CT enterography .

## 2019-03-28 ENCOUNTER — APPOINTMENT (OUTPATIENT)
Dept: GASTROENTEROLOGY | Facility: CLINIC | Age: 54
End: 2019-03-28

## 2019-10-26 ENCOUNTER — INPATIENT (INPATIENT)
Facility: HOSPITAL | Age: 54
LOS: 1 days | Discharge: HOME | DRG: 313 | End: 2019-10-28
Attending: INTERNAL MEDICINE | Admitting: INTERNAL MEDICINE
Payer: COMMERCIAL

## 2019-10-26 VITALS
SYSTOLIC BLOOD PRESSURE: 173 MMHG | OXYGEN SATURATION: 99 % | HEART RATE: 74 BPM | RESPIRATION RATE: 16 BRPM | WEIGHT: 184.09 LBS | DIASTOLIC BLOOD PRESSURE: 79 MMHG | TEMPERATURE: 98 F | HEIGHT: 70 IN

## 2019-10-26 DIAGNOSIS — D64.9 ANEMIA, UNSPECIFIED: ICD-10-CM

## 2019-10-26 DIAGNOSIS — I10 ESSENTIAL (PRIMARY) HYPERTENSION: ICD-10-CM

## 2019-10-26 DIAGNOSIS — R07.9 CHEST PAIN, UNSPECIFIED: ICD-10-CM

## 2019-10-26 DIAGNOSIS — Z29.9 ENCOUNTER FOR PROPHYLACTIC MEASURES, UNSPECIFIED: ICD-10-CM

## 2019-10-26 DIAGNOSIS — I25.10 ATHEROSCLEROTIC HEART DISEASE OF NATIVE CORONARY ARTERY WITHOUT ANGINA PECTORIS: ICD-10-CM

## 2019-10-26 DIAGNOSIS — N40.0 BENIGN PROSTATIC HYPERPLASIA WITHOUT LOWER URINARY TRACT SYMPTOMS: ICD-10-CM

## 2019-10-26 DIAGNOSIS — C92.10 CHRONIC MYELOID LEUKEMIA, BCR/ABL-POSITIVE, NOT HAVING ACHIEVED REMISSION: ICD-10-CM

## 2019-10-26 DIAGNOSIS — E11.9 TYPE 2 DIABETES MELLITUS WITHOUT COMPLICATIONS: ICD-10-CM

## 2019-10-26 DIAGNOSIS — Z95.1 PRESENCE OF AORTOCORONARY BYPASS GRAFT: Chronic | ICD-10-CM

## 2019-10-26 LAB
ALBUMIN SERPL ELPH-MCNC: 3.3 G/DL — LOW (ref 3.5–5)
ALP SERPL-CCNC: 79 U/L — SIGNIFICANT CHANGE UP (ref 40–120)
ALT FLD-CCNC: 27 U/L DA — SIGNIFICANT CHANGE UP (ref 10–60)
ANION GAP SERPL CALC-SCNC: 4 MMOL/L — LOW (ref 5–17)
APTT BLD: 30.8 SEC — SIGNIFICANT CHANGE UP (ref 27.5–36.3)
AST SERPL-CCNC: 18 U/L — SIGNIFICANT CHANGE UP (ref 10–40)
BILIRUB SERPL-MCNC: 0.2 MG/DL — SIGNIFICANT CHANGE UP (ref 0.2–1.2)
BUN SERPL-MCNC: 15 MG/DL — SIGNIFICANT CHANGE UP (ref 7–18)
CALCIUM SERPL-MCNC: 9.2 MG/DL — SIGNIFICANT CHANGE UP (ref 8.4–10.5)
CHLORIDE SERPL-SCNC: 107 MMOL/L — SIGNIFICANT CHANGE UP (ref 96–108)
CHOLEST SERPL-MCNC: 152 MG/DL — SIGNIFICANT CHANGE UP (ref 10–199)
CO2 SERPL-SCNC: 29 MMOL/L — SIGNIFICANT CHANGE UP (ref 22–31)
CREAT SERPL-MCNC: 0.8 MG/DL — SIGNIFICANT CHANGE UP (ref 0.5–1.3)
GLUCOSE BLDC GLUCOMTR-MCNC: 207 MG/DL — HIGH (ref 70–99)
GLUCOSE SERPL-MCNC: 273 MG/DL — HIGH (ref 70–99)
HCT VFR BLD CALC: 36.1 % — LOW (ref 39–50)
HDLC SERPL-MCNC: 46 MG/DL — SIGNIFICANT CHANGE UP
HGB BLD-MCNC: 11.5 G/DL — LOW (ref 13–17)
INR BLD: 0.97 RATIO — SIGNIFICANT CHANGE UP (ref 0.88–1.16)
LIDOCAIN IGE QN: 79 U/L — SIGNIFICANT CHANGE UP (ref 73–393)
LIPID PNL WITH DIRECT LDL SERPL: 77 MG/DL — SIGNIFICANT CHANGE UP
MAGNESIUM SERPL-MCNC: 2.5 MG/DL — SIGNIFICANT CHANGE UP (ref 1.6–2.6)
MCHC RBC-ENTMCNC: 26.7 PG — LOW (ref 27–34)
MCHC RBC-ENTMCNC: 31.9 GM/DL — LOW (ref 32–36)
MCV RBC AUTO: 84 FL — SIGNIFICANT CHANGE UP (ref 80–100)
NRBC # BLD: 0 /100 WBCS — SIGNIFICANT CHANGE UP (ref 0–0)
NT-PROBNP SERPL-SCNC: 448 PG/ML — HIGH (ref 0–125)
PLATELET # BLD AUTO: 185 K/UL — SIGNIFICANT CHANGE UP (ref 150–400)
POTASSIUM SERPL-MCNC: 4.5 MMOL/L — SIGNIFICANT CHANGE UP (ref 3.5–5.3)
POTASSIUM SERPL-SCNC: 4.5 MMOL/L — SIGNIFICANT CHANGE UP (ref 3.5–5.3)
PROT SERPL-MCNC: 7 G/DL — SIGNIFICANT CHANGE UP (ref 6–8.3)
PROTHROM AB SERPL-ACNC: 10.7 SEC — SIGNIFICANT CHANGE UP (ref 10–12.9)
RBC # BLD: 4.3 M/UL — SIGNIFICANT CHANGE UP (ref 4.2–5.8)
RBC # FLD: 14.3 % — SIGNIFICANT CHANGE UP (ref 10.3–14.5)
SODIUM SERPL-SCNC: 140 MMOL/L — SIGNIFICANT CHANGE UP (ref 135–145)
TOTAL CHOLESTEROL/HDL RATIO MEASUREMENT: 3.3 RATIO — LOW (ref 3.4–9.6)
TRIGL SERPL-MCNC: 146 MG/DL — SIGNIFICANT CHANGE UP (ref 10–149)
TROPONIN I SERPL-MCNC: 0.07 NG/ML — HIGH (ref 0–0.04)
WBC # BLD: 6.65 K/UL — SIGNIFICANT CHANGE UP (ref 3.8–10.5)
WBC # FLD AUTO: 6.65 K/UL — SIGNIFICANT CHANGE UP (ref 3.8–10.5)

## 2019-10-26 PROCEDURE — 71046 X-RAY EXAM CHEST 2 VIEWS: CPT | Mod: 26

## 2019-10-26 PROCEDURE — 99285 EMERGENCY DEPT VISIT HI MDM: CPT

## 2019-10-26 RX ORDER — SITAGLIPTIN AND METFORMIN HYDROCHLORIDE 500; 50 MG/1; MG/1
1 TABLET, FILM COATED ORAL
Qty: 0 | Refills: 0 | DISCHARGE

## 2019-10-26 RX ORDER — TAMSULOSIN HYDROCHLORIDE 0.4 MG/1
0.4 CAPSULE ORAL AT BEDTIME
Refills: 0 | Status: DISCONTINUED | OUTPATIENT
Start: 2019-10-26 | End: 2019-10-28

## 2019-10-26 RX ORDER — ATORVASTATIN CALCIUM 80 MG/1
40 TABLET, FILM COATED ORAL AT BEDTIME
Refills: 0 | Status: DISCONTINUED | OUTPATIENT
Start: 2019-10-26 | End: 2019-10-28

## 2019-10-26 RX ORDER — LISINOPRIL 2.5 MG/1
2.5 TABLET ORAL DAILY
Refills: 0 | Status: DISCONTINUED | OUTPATIENT
Start: 2019-10-26 | End: 2019-10-28

## 2019-10-26 RX ORDER — BOSUTINIB 50 MG/1
1 CAPSULE ORAL
Qty: 0 | Refills: 0 | DISCHARGE

## 2019-10-26 RX ORDER — METOPROLOL TARTRATE 50 MG
25 TABLET ORAL
Refills: 0 | Status: DISCONTINUED | OUTPATIENT
Start: 2019-10-26 | End: 2019-10-28

## 2019-10-26 RX ORDER — BOSUTINIB 50 MG/1
400 CAPSULE ORAL DAILY
Refills: 0 | Status: DISCONTINUED | OUTPATIENT
Start: 2019-10-26 | End: 2019-10-26

## 2019-10-26 RX ORDER — HEPARIN SODIUM 5000 [USP'U]/ML
5000 INJECTION INTRAVENOUS; SUBCUTANEOUS EVERY 12 HOURS
Refills: 0 | Status: DISCONTINUED | OUTPATIENT
Start: 2019-10-26 | End: 2019-10-28

## 2019-10-26 RX ORDER — GLUCAGON INJECTION, SOLUTION 0.5 MG/.1ML
1 INJECTION, SOLUTION SUBCUTANEOUS ONCE
Refills: 0 | Status: DISCONTINUED | OUTPATIENT
Start: 2019-10-26 | End: 2019-10-28

## 2019-10-26 RX ORDER — SODIUM CHLORIDE 9 MG/ML
1000 INJECTION, SOLUTION INTRAVENOUS
Refills: 0 | Status: DISCONTINUED | OUTPATIENT
Start: 2019-10-26 | End: 2019-10-28

## 2019-10-26 RX ORDER — DEXTROSE 10 % IN WATER 10 %
250 INTRAVENOUS SOLUTION INTRAVENOUS ONCE
Refills: 0 | Status: DISCONTINUED | OUTPATIENT
Start: 2019-10-26 | End: 2019-10-28

## 2019-10-26 RX ORDER — METOPROLOL TARTRATE 50 MG
1 TABLET ORAL
Qty: 0 | Refills: 0 | DISCHARGE

## 2019-10-26 RX ORDER — DEXTROSE 50 % IN WATER 50 %
15 SYRINGE (ML) INTRAVENOUS ONCE
Refills: 0 | Status: DISCONTINUED | OUTPATIENT
Start: 2019-10-26 | End: 2019-10-28

## 2019-10-26 RX ORDER — DAPAGLIFLOZIN 10 MG/1
1 TABLET, FILM COATED ORAL
Qty: 0 | Refills: 0 | DISCHARGE

## 2019-10-26 RX ORDER — ASPIRIN/CALCIUM CARB/MAGNESIUM 324 MG
81 TABLET ORAL DAILY
Refills: 0 | Status: DISCONTINUED | OUTPATIENT
Start: 2019-10-26 | End: 2019-10-28

## 2019-10-26 RX ORDER — DEXTROSE 10 % IN WATER 10 %
125 INTRAVENOUS SOLUTION INTRAVENOUS ONCE
Refills: 0 | Status: DISCONTINUED | OUTPATIENT
Start: 2019-10-26 | End: 2019-10-28

## 2019-10-26 RX ORDER — ASPIRIN/CALCIUM CARB/MAGNESIUM 324 MG
81 TABLET ORAL ONCE
Refills: 0 | Status: COMPLETED | OUTPATIENT
Start: 2019-10-26 | End: 2019-10-26

## 2019-10-26 RX ORDER — DASATINIB 80 MG/1
1 TABLET ORAL
Qty: 0 | Refills: 0 | DISCHARGE

## 2019-10-26 RX ORDER — INSULIN LISPRO 100/ML
VIAL (ML) SUBCUTANEOUS
Refills: 0 | Status: DISCONTINUED | OUTPATIENT
Start: 2019-10-26 | End: 2019-10-28

## 2019-10-26 RX ORDER — TAMSULOSIN HYDROCHLORIDE 0.4 MG/1
1 CAPSULE ORAL
Qty: 0 | Refills: 0 | DISCHARGE

## 2019-10-26 RX ORDER — LISINOPRIL 2.5 MG/1
1 TABLET ORAL
Qty: 0 | Refills: 0 | DISCHARGE

## 2019-10-26 RX ORDER — INSULIN LISPRO 100/ML
VIAL (ML) SUBCUTANEOUS AT BEDTIME
Refills: 0 | Status: DISCONTINUED | OUTPATIENT
Start: 2019-10-26 | End: 2019-10-26

## 2019-10-26 RX ADMIN — HEPARIN SODIUM 5000 UNIT(S): 5000 INJECTION INTRAVENOUS; SUBCUTANEOUS at 22:43

## 2019-10-26 RX ADMIN — ATORVASTATIN CALCIUM 40 MILLIGRAM(S): 80 TABLET, FILM COATED ORAL at 22:43

## 2019-10-26 RX ADMIN — Medication 0: at 23:50

## 2019-10-26 RX ADMIN — Medication 81 MILLIGRAM(S): at 18:16

## 2019-10-26 RX ADMIN — Medication 25 MILLIGRAM(S): at 22:47

## 2019-10-26 RX ADMIN — Medication 2: at 23:55

## 2019-10-26 RX ADMIN — TAMSULOSIN HYDROCHLORIDE 0.4 MILLIGRAM(S): 0.4 CAPSULE ORAL at 22:42

## 2019-10-26 RX ADMIN — LISINOPRIL 2.5 MILLIGRAM(S): 2.5 TABLET ORAL at 22:45

## 2019-10-26 NOTE — ED ADULT NURSE NOTE - NSIMPLEMENTINTERV_GEN_ALL_ED
Implemented All Universal Safety Interventions:  [place] to call system. Call bell, personal items and telephone within reach. Instruct patient to call for assistance. Room bathroom lighting operational. Non-slip footwear when patient is off stretcher. Physically safe environment: no spills, clutter or unnecessary equipment. Stretcher in lowest position, wheels locked, appropriate side rails in place.

## 2019-10-26 NOTE — H&P ADULT - PROBLEM SELECTOR PLAN 8
IMPROVE VTE Individual Risk Assessment  RISK                                                                Points  [  ] Previous VTE                                                  3  [  ] Thrombophilia                                               2  [  ] Lower limb paralysis                                      2        (unable to hold up >15 seconds)    [  ] Current Cancer                                              2         (within 6 months)  [x  ] Immobilization > 24 hrs                                1  [  ] ICU/CCU stay > 24 hours                              1  [  ] Age > 60                                                      1  IMPROVE VTE Score 1,   will start on heparin SC as pt. will be relatively bed bound

## 2019-10-26 NOTE — H&P ADULT - PROBLEM SELECTOR PLAN 6
home meds: janumet and glipizide  Hold oral hypoglycemics  c/w HSS   FU A1C  Monitor fingerstick and adjust meds

## 2019-10-26 NOTE — H&P ADULT - ATTENDING COMMENTS
Above  noted  54M from home, PMHx of CAD s/p quadruple bypass(2017), HTN, CML, DM presented to ED c/o chest pain. He complaints of 5-6/10 pin prick type, non radiating. worsening lt. sided chest pain since yesterday, no aggravating or relieving factor. He is complaint with his meds and follows cardiologist after bypass surgery and gets stress test every 6months, last stress test was 5month ago which was normal and he is due for his next after a month. He denies any shortness of breath, palpitation, cough, fever, N/V, urinary or bowel complaints.   Blood tests  radiology EKG reviewed    Patient  is  continue to c/o  precordial pain  elevated troponin   Impression  Precordial chest  pain  Elevated  Troponin  ACS    CAD Quadriple  bypass    HTN    DM with Hyperglycemia  poorly controlled    CML Mild  Anemia   HLD  BPH   Admit to telemetry  as per  protocol  cardiology  evaluation  agreed  with current  regiment  GI DVT prophyalxis, Follow  Troponin

## 2019-10-26 NOTE — ED PROVIDER NOTE - DURATION
Medical team to advance diet when medically feasible via tolerated route. Consider advancing to clear liquid, followed by regular diet as tolerated. If NPO status > 7 days, consider alternate means of nutrition if within pt/family GOC.
yesterday

## 2019-10-26 NOTE — H&P ADULT - PROBLEM SELECTOR PLAN 3
In  My last note I said we could do L spine CLEMENTE if the PT did not help with the LBP.    That is fine. He needs to follow up thereafter.    samra li

## 2019-10-26 NOTE — H&P ADULT - NSICDXPASTMEDICALHX_GEN_ALL_CORE_FT
PAST MEDICAL HISTORY:  CAD (coronary artery disease)     CML (chronic myelocytic leukemia)     DM (diabetes mellitus)     Gout     HLD (hyperlipidemia)     Hypertension     PNA (pneumonia)

## 2019-10-26 NOTE — H&P ADULT - ASSESSMENT
54M from home, PMHx of CAD s/p quadruple bypass(2017), HTN, CML, DM presented to ED c/o lt. sided chest pain.

## 2019-10-26 NOTE — H&P ADULT - HISTORY OF PRESENT ILLNESS
54M from home, PMHx of CAD s/p quadruple bypass(2017), HTN, CML, DM presented to ED c/o chest pain. He complaints of 5-6/10 pin prick type, non radiating. worsening lt. sided chest pain since yesterday, no aggravating or relieving factor. He is complaint with his meds and follows cardiologist after bypass surgery and gets stress test every 6months, last stress test was 5month ago which was normal and he is due for his next after a month. He denies any shortness of breath, palpitation, cough, fever, N/V, urinary or bowel complaints.

## 2019-10-26 NOTE — ED PROVIDER NOTE - OBJECTIVE STATEMENT
55 y/o M with past hx of HTN, CML, DM, CAD s/p quadruple bypass in 2017, presenting with intermittent left sided chest tightness and pain since yesterday morning. Denies SOB, dizziness, cough, or fever. Pt is due for stress test next month. No further complaints at this time.

## 2019-10-26 NOTE — H&P ADULT - NSHPSOCIALHISTORY_GEN_ALL_CORE
Denies use of alcohol, cigarettes and recreational drugs. The patient is a 6y1m year [age] Male complaining of fever.

## 2019-10-27 LAB
ALBUMIN SERPL ELPH-MCNC: 3.1 G/DL — LOW (ref 3.5–5)
ALP SERPL-CCNC: 72 U/L — SIGNIFICANT CHANGE UP (ref 40–120)
ALT FLD-CCNC: 25 U/L DA — SIGNIFICANT CHANGE UP (ref 10–60)
ANION GAP SERPL CALC-SCNC: 3 MMOL/L — LOW (ref 5–17)
AST SERPL-CCNC: 12 U/L — SIGNIFICANT CHANGE UP (ref 10–40)
BASOPHILS # BLD AUTO: 0.04 K/UL — SIGNIFICANT CHANGE UP (ref 0–0.2)
BASOPHILS NFR BLD AUTO: 0.7 % — SIGNIFICANT CHANGE UP (ref 0–2)
BILIRUB SERPL-MCNC: 0.3 MG/DL — SIGNIFICANT CHANGE UP (ref 0.2–1.2)
BUN SERPL-MCNC: 13 MG/DL — SIGNIFICANT CHANGE UP (ref 7–18)
CALCIUM SERPL-MCNC: 8.3 MG/DL — LOW (ref 8.4–10.5)
CHLORIDE SERPL-SCNC: 106 MMOL/L — SIGNIFICANT CHANGE UP (ref 96–108)
CHOLEST SERPL-MCNC: 152 MG/DL — SIGNIFICANT CHANGE UP (ref 10–199)
CK MB BLD-MCNC: 2.2 % — SIGNIFICANT CHANGE UP (ref 0–3.5)
CK MB BLD-MCNC: 2.8 % — SIGNIFICANT CHANGE UP (ref 0–3.5)
CK MB CFR SERPL CALC: 2.1 NG/ML — SIGNIFICANT CHANGE UP (ref 0–3.6)
CK MB CFR SERPL CALC: 2.2 NG/ML — SIGNIFICANT CHANGE UP (ref 0–3.6)
CK SERPL-CCNC: 80 U/L — SIGNIFICANT CHANGE UP (ref 35–232)
CK SERPL-CCNC: 97 U/L — SIGNIFICANT CHANGE UP (ref 35–232)
CO2 SERPL-SCNC: 30 MMOL/L — SIGNIFICANT CHANGE UP (ref 22–31)
CREAT SERPL-MCNC: 0.7 MG/DL — SIGNIFICANT CHANGE UP (ref 0.5–1.3)
EOSINOPHIL # BLD AUTO: 0.24 K/UL — SIGNIFICANT CHANGE UP (ref 0–0.5)
EOSINOPHIL NFR BLD AUTO: 4.1 % — SIGNIFICANT CHANGE UP (ref 0–6)
FERRITIN SERPL-MCNC: 174 NG/ML — SIGNIFICANT CHANGE UP (ref 30–400)
GLUCOSE BLDC GLUCOMTR-MCNC: 160 MG/DL — HIGH (ref 70–99)
GLUCOSE BLDC GLUCOMTR-MCNC: 196 MG/DL — HIGH (ref 70–99)
GLUCOSE BLDC GLUCOMTR-MCNC: 346 MG/DL — HIGH (ref 70–99)
GLUCOSE SERPL-MCNC: 186 MG/DL — HIGH (ref 70–99)
HBA1C BLD-MCNC: 9.9 % — HIGH (ref 4–5.6)
HCT VFR BLD CALC: 34.7 % — LOW (ref 39–50)
HCV AB S/CO SERPL IA: 0.08 S/CO — SIGNIFICANT CHANGE UP (ref 0–0.99)
HCV AB SERPL-IMP: SIGNIFICANT CHANGE UP
HDLC SERPL-MCNC: 43 MG/DL — SIGNIFICANT CHANGE UP
HGB BLD-MCNC: 11.1 G/DL — LOW (ref 13–17)
IMM GRANULOCYTES NFR BLD AUTO: 0.2 % — SIGNIFICANT CHANGE UP (ref 0–1.5)
IRON SATN MFR SERPL: 20 % — SIGNIFICANT CHANGE UP (ref 20–55)
IRON SATN MFR SERPL: 53 UG/DL — LOW (ref 65–170)
LIPID PNL WITH DIRECT LDL SERPL: 80 MG/DL — SIGNIFICANT CHANGE UP
LYMPHOCYTES # BLD AUTO: 1.46 K/UL — SIGNIFICANT CHANGE UP (ref 1–3.3)
LYMPHOCYTES # BLD AUTO: 25.2 % — SIGNIFICANT CHANGE UP (ref 13–44)
MAGNESIUM SERPL-MCNC: 2.2 MG/DL — SIGNIFICANT CHANGE UP (ref 1.6–2.6)
MCHC RBC-ENTMCNC: 26.5 PG — LOW (ref 27–34)
MCHC RBC-ENTMCNC: 32 GM/DL — SIGNIFICANT CHANGE UP (ref 32–36)
MCV RBC AUTO: 82.8 FL — SIGNIFICANT CHANGE UP (ref 80–100)
MONOCYTES # BLD AUTO: 0.62 K/UL — SIGNIFICANT CHANGE UP (ref 0–0.9)
MONOCYTES NFR BLD AUTO: 10.7 % — SIGNIFICANT CHANGE UP (ref 2–14)
NEUTROPHILS # BLD AUTO: 3.43 K/UL — SIGNIFICANT CHANGE UP (ref 1.8–7.4)
NEUTROPHILS NFR BLD AUTO: 59.1 % — SIGNIFICANT CHANGE UP (ref 43–77)
NRBC # BLD: 0 /100 WBCS — SIGNIFICANT CHANGE UP (ref 0–0)
PHOSPHATE SERPL-MCNC: 2.4 MG/DL — LOW (ref 2.5–4.5)
PLATELET # BLD AUTO: 179 K/UL — SIGNIFICANT CHANGE UP (ref 150–400)
POTASSIUM SERPL-MCNC: 4.3 MMOL/L — SIGNIFICANT CHANGE UP (ref 3.5–5.3)
POTASSIUM SERPL-SCNC: 4.3 MMOL/L — SIGNIFICANT CHANGE UP (ref 3.5–5.3)
PROT SERPL-MCNC: 6.2 G/DL — SIGNIFICANT CHANGE UP (ref 6–8.3)
RBC # BLD: 4.19 M/UL — LOW (ref 4.2–5.8)
RBC # FLD: 14.3 % — SIGNIFICANT CHANGE UP (ref 10.3–14.5)
SODIUM SERPL-SCNC: 139 MMOL/L — SIGNIFICANT CHANGE UP (ref 135–145)
TIBC SERPL-MCNC: 264 UG/DL — SIGNIFICANT CHANGE UP (ref 250–450)
TOTAL CHOLESTEROL/HDL RATIO MEASUREMENT: 3.5 RATIO — SIGNIFICANT CHANGE UP (ref 3.4–9.6)
TRANSFERRIN SERPL-MCNC: 198 MG/DL — LOW (ref 200–360)
TRIGL SERPL-MCNC: 145 MG/DL — SIGNIFICANT CHANGE UP (ref 10–149)
TROPONIN I SERPL-MCNC: 0.07 NG/ML — HIGH (ref 0–0.04)
TROPONIN I SERPL-MCNC: 0.08 NG/ML — HIGH (ref 0–0.04)
TSH SERPL-MCNC: 3.21 UU/ML — SIGNIFICANT CHANGE UP (ref 0.34–4.82)
UIBC SERPL-MCNC: 211 UG/DL — SIGNIFICANT CHANGE UP (ref 110–370)
VIT B12 SERPL-MCNC: 664 PG/ML — SIGNIFICANT CHANGE UP (ref 232–1245)
WBC # BLD: 5.8 K/UL — SIGNIFICANT CHANGE UP (ref 3.8–10.5)
WBC # FLD AUTO: 5.8 K/UL — SIGNIFICANT CHANGE UP (ref 3.8–10.5)

## 2019-10-27 RX ORDER — NITROGLYCERIN 6.5 MG
0.4 CAPSULE, EXTENDED RELEASE ORAL
Refills: 0 | Status: DISCONTINUED | OUTPATIENT
Start: 2019-10-27 | End: 2019-10-28

## 2019-10-27 RX ADMIN — Medication 4: at 12:14

## 2019-10-27 RX ADMIN — Medication 1: at 08:55

## 2019-10-27 RX ADMIN — Medication 0.4 MILLIGRAM(S): at 08:34

## 2019-10-27 RX ADMIN — TAMSULOSIN HYDROCHLORIDE 0.4 MILLIGRAM(S): 0.4 CAPSULE ORAL at 21:10

## 2019-10-27 RX ADMIN — ATORVASTATIN CALCIUM 40 MILLIGRAM(S): 80 TABLET, FILM COATED ORAL at 21:10

## 2019-10-27 RX ADMIN — HEPARIN SODIUM 5000 UNIT(S): 5000 INJECTION INTRAVENOUS; SUBCUTANEOUS at 06:55

## 2019-10-27 RX ADMIN — LISINOPRIL 2.5 MILLIGRAM(S): 2.5 TABLET ORAL at 06:55

## 2019-10-27 RX ADMIN — Medication 81 MILLIGRAM(S): at 12:14

## 2019-10-27 RX ADMIN — Medication 1: at 17:30

## 2019-10-27 RX ADMIN — Medication 25 MILLIGRAM(S): at 06:55

## 2019-10-27 RX ADMIN — Medication 0.4 MILLIGRAM(S): at 08:50

## 2019-10-27 RX ADMIN — HEPARIN SODIUM 5000 UNIT(S): 5000 INJECTION INTRAVENOUS; SUBCUTANEOUS at 18:01

## 2019-10-27 RX ADMIN — Medication 25 MILLIGRAM(S): at 18:02

## 2019-10-27 NOTE — PROGRESS NOTE ADULT - SUBJECTIVE AND OBJECTIVE BOX
Patient is a 54y old  Male who presents with a chief complaint of Chest pain (27 Oct 2019 13:49)      INTERVAL HPI/OVERNIGHT EVENTS: patient  is  continue to  c/o chest pressure  and discomfort left  side  Cardiology evaluation noted  schedule for  stress tests    T(C): 36.7 (10-27-19 @ 18:28), Max: 36.8 (10-27-19 @ 16:13)  HR: 71 (10-27-19 @ 18:28) (58 - 71)  BP: 140/61 (10-27-19 @ 18:28) (115/55 - 158/70)  RR: 16 (10-27-19 @ 18:28) (16 - 18)  SpO2: 100% (10-27-19 @ 18:28) (99% - 100%)  Wt(kg): --Vital Signs Last 24 Hrs  T(C): 36.7 (27 Oct 2019 18:28), Max: 36.8 (27 Oct 2019 16:13)  T(F): 98 (27 Oct 2019 18:28), Max: 98.3 (27 Oct 2019 16:13)  HR: 71 (27 Oct 2019 18:28) (58 - 71)  BP: 140/61 (27 Oct 2019 18:28) (115/55 - 158/70)  BP(mean): --  RR: 16 (27 Oct 2019 18:28) (16 - 18)  SpO2: 100% (27 Oct 2019 18:28) (99% - 100%)  I&O's Summary      LABS:                        11.1   5.80  )-----------( 179      ( 27 Oct 2019 05:56 )             34.7     10-27    139  |  106  |  13  ----------------------------<  186<H>  4.3   |  30  |  0.70    Ca    8.3<L>      27 Oct 2019 05:56  Phos  2.4     10-27  Mg     2.2     10-27    TPro  6.2  /  Alb  3.1<L>  /  TBili  0.3  /  DBili  x   /  AST  12  /  ALT  25  /  AlkPhos  72  10-27    PT/INR - ( 26 Oct 2019 18:24 )   PT: 10.7 sec;   INR: 0.97 ratio         PTT - ( 26 Oct 2019 18:24 )  PTT:30.8 sec    CAPILLARY BLOOD GLUCOSE      POCT Blood Glucose.: 160 mg/dL (27 Oct 2019 16:37)  POCT Blood Glucose.: 346 mg/dL (27 Oct 2019 11:50)  POCT Blood Glucose.: 196 mg/dL (27 Oct 2019 07:54)  POCT Blood Glucose.: 207 mg/dL (26 Oct 2019 23:29)    MEDICATIONS  (PRN):  dextrose 40% Gel 15 Gram(s) Oral once PRN Blood Glucose LESS THAN 70 milliGRAM(s)/deciLiter  glucagon  Injectable 1 milliGRAM(s) IntraMuscular once PRN Glucose <70 milliGRAM(s)/deciLiter  nitroglycerin     SubLingual 0.4 milliGRAM(s) SubLingual every 5 minutes PRN Chest Pain        REVIEW OF SYSTEMS:  CONSTITUTIONAL: No fever, weight loss, or fatigue  EYES: No eye pain, visual disturbances, or discharge  ENMT:  No difficulty hearing, tinnitus, vertigo; No sinus or throat pain  NECK: No pain or stiffness  BREASTS: No pain, masses, or nipple discharge  RESPIRATORY: No cough, wheezing, chills or hemoptysis; No shortness of breath  CARDIOVASCULAR: No chest pain, palpitations, dizziness, or leg swelling  GASTROINTESTINAL: No abdominal or epigastric pain. No nausea, vomiting, or hematemesis; No diarrhea or constipation. No melena or hematochezia.  GENITOURINARY: No dysuria, frequency, hematuria, or incontinence  NEUROLOGICAL: No headaches, memory loss, loss of strength, numbness, or tremors  SKIN: No itching, burning, rashes, or lesions   LYMPH NODES: No enlarged glands  ENDOCRINE: No heat or cold intolerance; No hair loss  MUSCULOSKELETAL: No joint pain or swelling; No muscle, back, or extremity pain  PSYCHIATRIC: No depression, anxiety, mood swings, or difficulty sleeping  HEME/LYMPH: No easy bruising, or bleeding gums  ALLERGY AND IMMUNOLOGIC: No hives or eczema    RADIOLOGY & ADDITIONAL TESTS:    Imaging Personally Reviewed:  [ ] YES  [ ] NO    Consultant(s) Notes Reviewed:  [x ] YES  [ ] NO    PHYSICAL EXAM:  GENERAL: NAD, well-groomed, well-developed  HEAD:  Atraumatic, Normocephalic  EYES: EOMI, PERRLA, conjunctiva and sclera clear  ENMT: No tonsillar erythema, exudates, or enlargement; Moist mucous membranes, Good dentition, No lesions  NECK: Supple, No JVD, Normal thyroid  NERVOUS SYSTEM:  Alert & Oriented X3, Good concentration; Motor Strength 5/5 B/L upper and lower extremities; DTRs 2+ intact and symmetric  CHEST/LUNG: Clear to percussion bilaterally; No rales, rhonchi, wheezing, or rubs  HEART: Regular rate and rhythm; No murmurs, rubs, or gallops  ABDOMEN: Soft, Nontender, Nondistended; Bowel sounds present  EXTREMITIES:  2+ Peripheral Pulses, No clubbing, cyanosis, or edema  LYMPH: No lymphadenopathy noted  SKIN: No rashes or lesions    Care Discussed with Consultants/Other Providers [ x] YES  [ ] NO

## 2019-10-27 NOTE — CONSULT NOTE ADULT - SUBJECTIVE AND OBJECTIVE BOX
CHIEF COMPLAINT:Patient is a 54y old  Male who presents with a chief complaint of Chest pain.      HPI:  54 yr old Male from home, PMHx of CAD s/p quadruple bypass(2017), HTN, CML, DM presented to ED c/o chest pain. He complaints of 5-6/10 pin prick type, non radiating. worsening lt. sided chest pain since yesterday, no aggravating or relieving factor. He is complaint with his meds and follows cardiologist after bypass surgery and gets stress test every 6months, last stress test was 5month ago which was normal and he is due for his next after a month. He denies any shortness of breath, palpitation, cough, fever, N/V, urinary or bowel complaints. (26 Oct 2019 21:34)      PAST MEDICAL & SURGICAL HISTORY:  PNA (pneumonia)  HLD (hyperlipidemia)  CAD (coronary artery disease)  CML (chronic myelocytic leukemia)  Gout  Hypertension  DM (diabetes mellitus)  S/P CABG (coronary artery bypass graft)      MEDICATIONS  (STANDING):  aspirin enteric coated 81 milliGRAM(s) Oral daily  atorvastatin 40 milliGRAM(s) Oral at bedtime  Bosutinib  ( Bosulif) 400 milliGRAM(s) 400 milliGRAM(s) Oral daily  dextrose 10% Bolus 125 milliLiter(s) IV Bolus once  dextrose 10% Bolus 250 milliLiter(s) IV Bolus once  dextrose 5%. 1000 milliLiter(s) (50 mL/Hr) IV Continuous <Continuous>  heparin  Injectable 5000 Unit(s) SubCutaneous every 12 hours  insulin lispro (HumaLOG) corrective regimen sliding scale   SubCutaneous three times a day before meals  lisinopril 2.5 milliGRAM(s) Oral daily  metoprolol tartrate 25 milliGRAM(s) Oral two times a day  tamsulosin 0.4 milliGRAM(s) Oral at bedtime    MEDICATIONS  (PRN):  dextrose 40% Gel 15 Gram(s) Oral once PRN Blood Glucose LESS THAN 70 milliGRAM(s)/deciLiter  glucagon  Injectable 1 milliGRAM(s) IntraMuscular once PRN Glucose <70 milliGRAM(s)/deciLiter  nitroglycerin     SubLingual 0.4 milliGRAM(s) SubLingual every 5 minutes PRN Chest Pain      FAMILY HISTORY:  Family history of stroke      SOCIAL HISTORY:    [ x] Non-smoker    [ x] Alcohol-denies    Allergies    No Known Allergies    Intolerances    	    REVIEW OF SYSTEMS:  CONSTITUTIONAL: No fever, weight loss, or fatigue  EYES: No eye pain, visual disturbances, or discharge  ENT:  No difficulty hearing, tinnitus, vertigo; No sinus or throat pain  NECK: No pain or stiffness  RESPIRATORY: No cough, wheezing, chills or hemoptysis; No Shortness of Breath  CARDIOVASCULAR: + chest pain,No palpitations, passing out, dizziness, or leg swelling  GASTROINTESTINAL: No abdominal or epigastric pain. No nausea, vomiting, or hematemesis; No diarrhea or constipation. No melena or hematochezia.  GENITOURINARY: No dysuria, frequency, hematuria, or incontinence  NEUROLOGICAL: No headaches, memory loss, loss of strength, numbness, or tremors  SKIN: No itching, burning, rashes, or lesions   LYMPH Nodes: No enlarged glands  ENDOCRINE: No heat or cold intolerance; No hair loss  MUSCULOSKELETAL: No joint pain or swelling; No muscle, back, or extremity pain  PSYCHIATRIC: No depression, anxiety, mood swings, or difficulty sleeping  HEME/LYMPH: No easy bruising, or bleeding gums  ALLERGY AND IMMUNOLOGIC: No hives or eczema	      PHYSICAL EXAM:  T(C): 36.7 (10-27-19 @ 11:10), Max: 37.2 (10-26-19 @ 18:32)  HR: 64 (10-27-19 @ 11:10) (58 - 79)  BP: 132/60 (10-27-19 @ 11:10) (115/55 - 173/79)  RR: 16 (10-27-19 @ 11:10) (16 - 18)  SpO2: 100% (10-27-19 @ 11:10) (98% - 100%)  Wt(kg): --  I&O's Summary      Appearance: Normal	  HEENT:   Normal oral mucosa, PERRL, EOMI	  Lymphatic: No lymphadenopathy  Cardiovascular: Normal S1 S2, No JVD, No murmurs, No edema  Respiratory: Lungs clear to auscultation	  Psychiatry: A & O x 3, Mood & affect appropriate  Gastrointestinal:  Soft, Non-tender, + BS	  Skin: No rashes, No ecchymoses, No cyanosis	  Neurologic: Non-focal  Extremities: Normal range of motion, No clubbing, cyanosis or edema  Vascular: Peripheral pulses palpable 2+ bilaterally       ECG:  	nsr    	  LABS:	 	      CARDIAC MARKERS ( 27 Oct 2019 05:56 )  0.072 ng/mL / x     / 80 U/L / x     / 2.2 ng/mL  CARDIAC MARKERS ( 26 Oct 2019 23:34 )  0.076 ng/mL / x     / 97 U/L / x     / 2.1 ng/mL  CARDIAC MARKERS ( 26 Oct 2019 18:24 )  0.072 ng/mL / x     / x     / x     / x                                  11.1   5.80  )-----------( 179      ( 27 Oct 2019 05:56 )             34.7     10-27    139  |  106  |  13  ----------------------------<  186<H>  4.3   |  30  |  0.70    Ca    8.3<L>      27 Oct 2019 05:56  Phos  2.4     10-27  Mg     2.2     10-27    TPro  6.2  /  Alb  3.1<L>  /  TBili  0.3  /  DBili  x   /  AST  12  /  ALT  25  /  AlkPhos  72  10-27    proBNP: Serum Pro-Brain Natriuretic Peptide: 448 pg/mL (10-26 @ 18:24)    Lipid Profile: Cholesterol 152  LDL 80  HDL 43    Cholesterol 152  LDL 77  HDL 46      HgA1c: Hemoglobin A1C, Whole Blood: 9.9 % (10-27 @ 12:06)    TSH: Thyroid Stimulating Hormone, Serum: 3.21 uU/mL (10-27 @ 05:56)        EXAM:  XR CHEST PA LAT 2V                            PROCEDURE DATE:  10/26/2019          INTERPRETATION:  INDICATION: Chest Pain    PRIORS: 7/14/2017    VIEWS: Frontal and lateral radiographs of the chest performed.    FINDINGS: Heart size appearswithin normal limits. Sternotomy wires are   identified. No hilar or superior mediastinal abnormalities are identified.    There is no evidence for focal infiltrate, lobar consolidation or   pulmonary edema. No pleural effusion or pneumothorax is demonstrated. No   mediastinal shift is noted. The visualized osseous structures appear   unremarkable.    IMPRESSION: No evidence for focal infiltrate or lobar consolidation.

## 2019-10-27 NOTE — PROGRESS NOTE ADULT - ATTENDING COMMENTS
Above  noted  54M from home, PMHx of CAD s/p quadruple bypass(2017), HTN, CML, DM  Impression  Precordial chest  pain  Elevated  Troponin  ACS    CAD Quadriple  bypass    HTN    DM with Hyperglycemia  poorly controlled    CML Mild  Anemia   HLD  BPH   Continue   telemetry  as per  protocol  cardiology  evaluation  agreed  with current  regiment  GI DVT prophyalxis, Stress test in AM

## 2019-10-27 NOTE — ED CLERICAL - DIVISION
[facility]... [facility] [place]... [facility]

## 2019-10-27 NOTE — PATIENT PROFILE ADULT - FALL HARM RISK CONCLUSION
LAURA dialysis dependant on HD since 10/16/17 twice a week Mon and Fri 3.5 hrs duration  via [facility] at [facility] under the care of Dr. Peterson. Last HD Friday 5/25. Last recorded  ml last Thursday but feels decrease UOP since.   -Recommend torsemide 60 mg daily.   -DIscussed with patient avoid potato chips (bedside small bag of lays chip K 500 mg).  -Last HD 6/2  -Labs reviewed, K 5.4/Metabolic acidosis.   -Plan for HD for 6/3 and resume Mon/Fri. Dialysate will be adjusted to current labs    Anemia of CKD  -Hgb 9.7  -Resume Micerna per outpatient protocol.     BMD  Lab Results   Component Value Date    PTH 21.0 05/10/2018    CALCIUM 9.0 06/04/2018    CAION 1.07 03/07/2018    PHOS 3.9 06/04/2018   -Continue Renvela. Renal diet.        Universal Safety Interventions

## 2019-10-28 ENCOUNTER — TRANSCRIPTION ENCOUNTER (OUTPATIENT)
Age: 54
End: 2019-10-28

## 2019-10-28 VITALS
DIASTOLIC BLOOD PRESSURE: 82 MMHG | SYSTOLIC BLOOD PRESSURE: 146 MMHG | RESPIRATION RATE: 18 BRPM | OXYGEN SATURATION: 98 % | HEART RATE: 64 BPM | TEMPERATURE: 98 F

## 2019-10-28 DIAGNOSIS — R07.9 CHEST PAIN, UNSPECIFIED: ICD-10-CM

## 2019-10-28 LAB
ALBUMIN SERPL ELPH-MCNC: 3.1 G/DL — LOW (ref 3.5–5)
ALP SERPL-CCNC: 77 U/L — SIGNIFICANT CHANGE UP (ref 40–120)
ALT FLD-CCNC: 25 U/L DA — SIGNIFICANT CHANGE UP (ref 10–60)
ANION GAP SERPL CALC-SCNC: 3 MMOL/L — LOW (ref 5–17)
AST SERPL-CCNC: 13 U/L — SIGNIFICANT CHANGE UP (ref 10–40)
BASOPHILS # BLD AUTO: 0.03 K/UL — SIGNIFICANT CHANGE UP (ref 0–0.2)
BASOPHILS NFR BLD AUTO: 0.4 % — SIGNIFICANT CHANGE UP (ref 0–2)
BILIRUB SERPL-MCNC: 0.3 MG/DL — SIGNIFICANT CHANGE UP (ref 0.2–1.2)
BUN SERPL-MCNC: 16 MG/DL — SIGNIFICANT CHANGE UP (ref 7–18)
CALCIUM SERPL-MCNC: 8.9 MG/DL — SIGNIFICANT CHANGE UP (ref 8.4–10.5)
CHLORIDE SERPL-SCNC: 106 MMOL/L — SIGNIFICANT CHANGE UP (ref 96–108)
CO2 SERPL-SCNC: 28 MMOL/L — SIGNIFICANT CHANGE UP (ref 22–31)
CREAT SERPL-MCNC: 0.88 MG/DL — SIGNIFICANT CHANGE UP (ref 0.5–1.3)
EOSINOPHIL # BLD AUTO: 0.25 K/UL — SIGNIFICANT CHANGE UP (ref 0–0.5)
EOSINOPHIL NFR BLD AUTO: 3.6 % — SIGNIFICANT CHANGE UP (ref 0–6)
GLUCOSE BLDC GLUCOMTR-MCNC: 231 MG/DL — HIGH (ref 70–99)
GLUCOSE BLDC GLUCOMTR-MCNC: 253 MG/DL — HIGH (ref 70–99)
GLUCOSE BLDC GLUCOMTR-MCNC: 315 MG/DL — HIGH (ref 70–99)
GLUCOSE SERPL-MCNC: 263 MG/DL — HIGH (ref 70–99)
HCT VFR BLD CALC: 36.7 % — LOW (ref 39–50)
HGB BLD-MCNC: 11.8 G/DL — LOW (ref 13–17)
IMM GRANULOCYTES NFR BLD AUTO: 0.3 % — SIGNIFICANT CHANGE UP (ref 0–1.5)
LYMPHOCYTES # BLD AUTO: 1.68 K/UL — SIGNIFICANT CHANGE UP (ref 1–3.3)
LYMPHOCYTES # BLD AUTO: 24 % — SIGNIFICANT CHANGE UP (ref 13–44)
MCHC RBC-ENTMCNC: 26.5 PG — LOW (ref 27–34)
MCHC RBC-ENTMCNC: 32.2 GM/DL — SIGNIFICANT CHANGE UP (ref 32–36)
MCV RBC AUTO: 82.5 FL — SIGNIFICANT CHANGE UP (ref 80–100)
MONOCYTES # BLD AUTO: 0.66 K/UL — SIGNIFICANT CHANGE UP (ref 0–0.9)
MONOCYTES NFR BLD AUTO: 9.4 % — SIGNIFICANT CHANGE UP (ref 2–14)
NEUTROPHILS # BLD AUTO: 4.37 K/UL — SIGNIFICANT CHANGE UP (ref 1.8–7.4)
NEUTROPHILS NFR BLD AUTO: 62.3 % — SIGNIFICANT CHANGE UP (ref 43–77)
NRBC # BLD: 0 /100 WBCS — SIGNIFICANT CHANGE UP (ref 0–0)
PLATELET # BLD AUTO: 189 K/UL — SIGNIFICANT CHANGE UP (ref 150–400)
POTASSIUM SERPL-MCNC: 4.9 MMOL/L — SIGNIFICANT CHANGE UP (ref 3.5–5.3)
POTASSIUM SERPL-SCNC: 4.9 MMOL/L — SIGNIFICANT CHANGE UP (ref 3.5–5.3)
PROT SERPL-MCNC: 6.7 G/DL — SIGNIFICANT CHANGE UP (ref 6–8.3)
RBC # BLD: 4.45 M/UL — SIGNIFICANT CHANGE UP (ref 4.2–5.8)
RBC # FLD: 14 % — SIGNIFICANT CHANGE UP (ref 10.3–14.5)
SODIUM SERPL-SCNC: 137 MMOL/L — SIGNIFICANT CHANGE UP (ref 135–145)
WBC # BLD: 7.01 K/UL — SIGNIFICANT CHANGE UP (ref 3.8–10.5)
WBC # FLD AUTO: 7.01 K/UL — SIGNIFICANT CHANGE UP (ref 3.8–10.5)

## 2019-10-28 PROCEDURE — 93306 TTE W/DOPPLER COMPLETE: CPT

## 2019-10-28 PROCEDURE — 80061 LIPID PANEL: CPT

## 2019-10-28 PROCEDURE — 82728 ASSAY OF FERRITIN: CPT

## 2019-10-28 PROCEDURE — 93005 ELECTROCARDIOGRAM TRACING: CPT

## 2019-10-28 PROCEDURE — 83880 ASSAY OF NATRIURETIC PEPTIDE: CPT

## 2019-10-28 PROCEDURE — 83550 IRON BINDING TEST: CPT

## 2019-10-28 PROCEDURE — 99285 EMERGENCY DEPT VISIT HI MDM: CPT | Mod: 25

## 2019-10-28 PROCEDURE — 84100 ASSAY OF PHOSPHORUS: CPT

## 2019-10-28 PROCEDURE — 36415 COLL VENOUS BLD VENIPUNCTURE: CPT

## 2019-10-28 PROCEDURE — 85730 THROMBOPLASTIN TIME PARTIAL: CPT

## 2019-10-28 PROCEDURE — 83036 HEMOGLOBIN GLYCOSYLATED A1C: CPT

## 2019-10-28 PROCEDURE — 82607 VITAMIN B-12: CPT

## 2019-10-28 PROCEDURE — 71046 X-RAY EXAM CHEST 2 VIEWS: CPT

## 2019-10-28 PROCEDURE — 82550 ASSAY OF CK (CPK): CPT

## 2019-10-28 PROCEDURE — 84484 ASSAY OF TROPONIN QUANT: CPT

## 2019-10-28 PROCEDURE — 85610 PROTHROMBIN TIME: CPT

## 2019-10-28 PROCEDURE — G0378: CPT

## 2019-10-28 PROCEDURE — 82553 CREATINE MB FRACTION: CPT

## 2019-10-28 PROCEDURE — 80053 COMPREHEN METABOLIC PANEL: CPT

## 2019-10-28 PROCEDURE — 84443 ASSAY THYROID STIM HORMONE: CPT

## 2019-10-28 PROCEDURE — 78452 HT MUSCLE IMAGE SPECT MULT: CPT

## 2019-10-28 PROCEDURE — 83540 ASSAY OF IRON: CPT

## 2019-10-28 PROCEDURE — 83690 ASSAY OF LIPASE: CPT

## 2019-10-28 PROCEDURE — A9502: CPT

## 2019-10-28 PROCEDURE — 85027 COMPLETE CBC AUTOMATED: CPT

## 2019-10-28 PROCEDURE — 83735 ASSAY OF MAGNESIUM: CPT

## 2019-10-28 PROCEDURE — 84466 ASSAY OF TRANSFERRIN: CPT

## 2019-10-28 PROCEDURE — 86803 HEPATITIS C AB TEST: CPT

## 2019-10-28 PROCEDURE — 93017 CV STRESS TEST TRACING ONLY: CPT

## 2019-10-28 PROCEDURE — 82962 GLUCOSE BLOOD TEST: CPT

## 2019-10-28 RX ORDER — ATORVASTATIN CALCIUM 80 MG/1
80 TABLET, FILM COATED ORAL AT BEDTIME
Refills: 0 | Status: DISCONTINUED | OUTPATIENT
Start: 2019-10-28 | End: 2019-10-28

## 2019-10-28 RX ORDER — RANOLAZINE 500 MG/1
500 TABLET, FILM COATED, EXTENDED RELEASE ORAL
Refills: 0 | Status: DISCONTINUED | OUTPATIENT
Start: 2019-10-28 | End: 2019-10-28

## 2019-10-28 RX ORDER — INSULIN GLARGINE 100 [IU]/ML
15 INJECTION, SOLUTION SUBCUTANEOUS AT BEDTIME
Refills: 0 | Status: DISCONTINUED | OUTPATIENT
Start: 2019-10-28 | End: 2019-10-28

## 2019-10-28 RX ORDER — RANOLAZINE 500 MG/1
1 TABLET, FILM COATED, EXTENDED RELEASE ORAL
Qty: 60 | Refills: 0
Start: 2019-10-28 | End: 2019-11-26

## 2019-10-28 RX ADMIN — HEPARIN SODIUM 5000 UNIT(S): 5000 INJECTION INTRAVENOUS; SUBCUTANEOUS at 05:47

## 2019-10-28 RX ADMIN — LISINOPRIL 2.5 MILLIGRAM(S): 2.5 TABLET ORAL at 05:48

## 2019-10-28 RX ADMIN — Medication 81 MILLIGRAM(S): at 11:55

## 2019-10-28 RX ADMIN — Medication 25 MILLIGRAM(S): at 05:48

## 2019-10-28 RX ADMIN — Medication 4: at 11:54

## 2019-10-28 NOTE — PROGRESS NOTE ADULT - ASSESSMENT
54 yr old Male from home, PMHx of CAD s/p quadruple bypass(2017), HTN, CML, DM presented to ED c/o chest pain. He complaints of 5-6/10 pin prick type, non radiating. worsening lt. sided chest pain.  1.Tele monitoring.  2.Add ranexa 500mg bid.  3.Stress test-r/o ischemia.  4.CAD-asa,b blocker,statin.  5.HTN-Ace.  6.Lipid d/o-statin.  7.BPH-flomax.  8.DM-Insulin.  9.GI and DVT prophylaxis.

## 2019-10-28 NOTE — CONSULT NOTE ADULT - ASSESSMENT
54 yr old Male from home, PMHx of CAD s/p quadruple bypass(2017), HTN, CML, DM presented to ED c/o chest pain. He complaints of 5-6/10 pin prick type, non radiating. worsening lt. sided chest pain.  1.Tele monitoring.  2.Echocardiogram.  3.Stress test-r/o ischemia.  4.CAD-asa,b blocker,statin.  5.HTN-Ace.  6.Lipid d/o-statin.  7.BPH-flomax.  8.DM-Insulin.  9.GI and DVT prophylaxis.
54M from home, PMHx of CAD s/p quadruple bypass(2017), HTN, CML, DM presented to ED c/o chest pain. Found to have un cont dm. Pt takes janumet bid and glipizide qd as out pt. Does not monitor FSG and denies hypoglycemic symptoms.

## 2019-10-28 NOTE — DISCHARGE NOTE PROVIDER - NSDCCPCAREPLAN_GEN_ALL_CORE_FT
PRINCIPAL DISCHARGE DIAGNOSIS  Diagnosis: Chest pain, rule out acute myocardial infarction  Assessment and Plan of Treatment: Rule out acute coronary syndrome and Prevent future events.  - You presented to the hospital with a complaint of chest pain.   - EKG showed normal sinus rhythm.   - you got Admitted to telemetry unit.   - Your serial cardiac enzymes were negative for Heart attack.   - No arrhythmia sen on Tele monitor.   - Your Echocardiogram was done and did not show any significant valvular pathology.    - You were seen by cardiologist Dr. Callaway who recommended stress test   - your Stress Test which came back negative for Ischemia  - We are able to rule out acute coronary syndrome as the cause of your pain at this time.   - You are medically stable to be discharged from the hospital.  You are being [prescribed renaxa to relieve your chest pain  - You need to follow up with Dr Callaway as outpatient        SECONDARY DISCHARGE DIAGNOSES  Diagnosis: DM (diabetes mellitus)  Assessment and Plan of Treatment: Your sugar was controlled in hospital, we gave you insulin in hospital. Continue with your home medications on discharge and adhere to low carbohydrate diet.   Your HBA1c is high. You need adjustment of your diabetic medications. Please follow up with Dr Kristin Shah as outopatient.       Diagnosis: Hypertension  Assessment and Plan of Treatment: Your blood pressure was well-controlled during your admission. Continue with your current regimen of anti-hypertensive medications as mentioned in your discharge summary and ensure that you follow up with your primary care provider for further recommendations and monitoring.

## 2019-10-28 NOTE — CONSULT NOTE ADULT - PROBLEM SELECTOR RECOMMENDATION 9
un cont with 9.9 %A1c  d/w pt need to change meds as out pt  pt will benefit with SGLT2 inhibitor in lieu of heart disease  start lantus 15 units in the hospital  fine tuning upon d/c  fsg ac and hs  d/w hs

## 2019-10-28 NOTE — DISCHARGE NOTE NURSING/CASE MANAGEMENT/SOCIAL WORK - PATIENT PORTAL LINK FT
You can access the FollowMyHealth Patient Portal offered by [facility] by registering at the following website: [email or web address]. By joining [facility]’s FollowMyHealth portal, you will also be able to view your health information using other applications (apps) compatible with our system.

## 2019-10-28 NOTE — PROGRESS NOTE ADULT - SUBJECTIVE AND OBJECTIVE BOX
CHIEF COMPLAINT:Patient is a 54y old  Male who presents with a chief complaint of Chest pain .Pt appears comfortable.    	  REVIEW OF SYSTEMS:  CONSTITUTIONAL: No fever, weight loss, or fatigue  EYES: No eye pain, visual disturbances, or discharge  ENT:  No difficulty hearing, tinnitus, vertigo; No sinus or throat pain  NECK: No pain or stiffness  RESPIRATORY: No cough, wheezing, chills or hemoptysis; No Shortness of Breath  CARDIOVASCULAR: No chest pain, palpitations, passing out, dizziness, or leg swelling  GASTROINTESTINAL: No abdominal or epigastric pain. No nausea, vomiting, or hematemesis; No diarrhea or constipation. No melena or hematochezia.  GENITOURINARY: No dysuria, frequency, hematuria, or incontinence  NEUROLOGICAL: No headaches, memory loss, loss of strength, numbness, or tremors  SKIN: No itching, burning, rashes, or lesions   LYMPH Nodes: No enlarged glands  ENDOCRINE: No heat or cold intolerance; No hair loss  MUSCULOSKELETAL: No joint pain or swelling; No muscle, back, or extremity pain  PSYCHIATRIC: No depression, anxiety, mood swings, or difficulty sleeping  HEME/LYMPH: No easy bruising, or bleeding gums  ALLERGY AND IMMUNOLOGIC: No hives or eczema	      PHYSICAL EXAM:  T(C): 36.7 (10-28-19 @ 07:41), Max: 36.8 (10-27-19 @ 16:13)  HR: 61 (10-28-19 @ 07:41) (60 - 72)  BP: 128/57 (10-28-19 @ 07:41) (128/57 - 146/69)  RR: 16 (10-28-19 @ 07:41) (16 - 18)  SpO2: 98% (10-28-19 @ 07:41) (96% - 100%)    Appearance: Normal	  HEENT:   Normal oral mucosa, PERRL, EOMI	  Lymphatic: No lymphadenopathy  Cardiovascular: Normal S1 S2, No JVD, No murmurs, No edema  Respiratory: Lungs clear to auscultation	  Psychiatry: A & O x 3, Mood & affect appropriate  Gastrointestinal:  Soft, Non-tender, + BS	  Skin: No rashes, No ecchymoses, No cyanosis	  Neurologic: Non-focal  Extremities: Normal range of motion, No clubbing, cyanosis or edema  Vascular: Peripheral pulses palpable 2+ bilaterally    MEDICATIONS  (STANDING):  aspirin enteric coated 81 milliGRAM(s) Oral daily  atorvastatin 40 milliGRAM(s) Oral at bedtime  Bosutinib  ( Bosulif) 400 milliGRAM(s) 400 milliGRAM(s) Oral daily  dextrose 10% Bolus 125 milliLiter(s) IV Bolus once  dextrose 10% Bolus 250 milliLiter(s) IV Bolus once  dextrose 5%. 1000 milliLiter(s) (50 mL/Hr) IV Continuous <Continuous>  heparin  Injectable 5000 Unit(s) SubCutaneous every 12 hours  insulin lispro (HumaLOG) corrective regimen sliding scale   SubCutaneous three times a day before meals  lisinopril 2.5 milliGRAM(s) Oral daily  metoprolol tartrate 25 milliGRAM(s) Oral two times a day  tamsulosin 0.4 milliGRAM(s) Oral at bedtime      LABS:	 	      CARDIAC MARKERS ( 27 Oct 2019 05:56 )  0.072 ng/mL / x     / 80 U/L / x     / 2.2 ng/mL  CARDIAC MARKERS ( 26 Oct 2019 23:34 )  0.076 ng/mL / x     / 97 U/L / x     / 2.1 ng/mL  CARDIAC MARKERS ( 26 Oct 2019 18:24 )  0.072 ng/mL / x     / x     / x     / x                             11.8   7.01  )-----------( 189      ( 28 Oct 2019 06:29 )             36.7     10-28    137  |  106  |  16  ----------------------------<  263<H>  4.9   |  28  |  0.88    Ca    8.9      28 Oct 2019 06:29  Phos  2.4     10-27  Mg     2.2     10-27    TPro  6.7  /  Alb  3.1<L>  /  TBili  0.3  /  DBili  x   /  AST  13  /  ALT  25  /  AlkPhos  77  10-28    proBNP: Serum Pro-Brain Natriuretic Peptide: 448 pg/mL (10-26 @ 18:24)    Lipid Profile: Cholesterol 152  LDL 80  HDL 43    Cholesterol 152  LDL 77  HDL 46      HgA1c: Hemoglobin A1C, Whole Blood: 9.9 % (10-27 @ 12:06)    TSH: Thyroid Stimulating Hormone, Serum: 3.21 uU/mL (10-27 @ 05:56)      	    OBSERVATIONS:  Mitral Valve: Normal mitral valve. Mild mitral  regurgitation.  Aortic Root: Aortic Root: 3.4 cm.    Aortic Valve: Normal trileaflet aortic valve. Mean  transaortic valve gradient equals 4 mm Hg, estimated aortic  valve area equals 1.8 sqcm (by continuity equation),  consistent with mild aortic stenosis.  Left Atrium: Normal left atrium.  LA volume index = 32  cc/m2.  Left Ventricle: Normal Left Ventricular Systolic Function,  (EF = 55 to 60%) Normal left ventricular internal  dimensions and wall thicknesses. Grade II diastolic  dysfunction.  Right Heart: Normal right atrium. Normal right ventricular  size and systolic function (TAPSE 1.8 cm). There is mild  tricuspid regurgitation. There is mild pulmonic  regurgitation.  Pericardium/PleuraNormal pericardium with no pericardial  effusion.  Hemodynamic: RV systolic pressure is mildly increased at  39 mm Hg.

## 2019-10-28 NOTE — DISCHARGE NOTE PROVIDER - HOSPITAL COURSE
54M from home, PMHx of CAD s/p quadruple bypass(2017), HTN, CML, DM presented to ED c/o chest pain. He complaints of 5-6/10 pin prick type, non radiating. worsening lt. sided chest pain since yesterday, no aggravating or relieving factor. He is complaint with his meds and follows cardiologist after bypass surgery and gets stress test every 6months, last stress test was 5month ago which was normal and he is due for his next after a month. He denies any shortness of breath, palpitation, cough, fever, N/V, urinary or bowel complaints.        During his stay h 54M from home, PMHx of CAD s/p quadruple bypass(2017), HTN, CML, DM presented to ED c/o chest pain. He complaints of 5-6/10 pin prick type, non radiating. worsening lt. sided chest pain since yesterday, no aggravating or relieving factor. He is complaint with his meds and follows cardiologist after bypass surgery and gets stress test every 6months, last stress test was 5month ago which was normal and he is due for his next after a month. He denies any shortness of breath, palpitation, cough, fever, N/V, urinary or bowel complaints    EKG showed NSR, troponins were elevated but trended down. Echo showed 55%ef. stress test was ndone which was negative for reversible ischemia. Cardiology dr Callaway was cnsolted. Renexa was added. Endocrinology Dr may was also consulted for diabetes.

## 2019-10-28 NOTE — DISCHARGE NOTE PROVIDER - CARE PROVIDER_API CALL
Ninoska Callaway)  Internal Medicine  [address] [place] [address]  Phone: [phone]  Fax: [phone]  Follow Up Time:     Kristin Shah)  EndocrinologyMetabDiabetes  [address] [place] [address]  Phone: [phone]  Fax: [phone]  Follow Up Time:

## 2019-10-29 RX ORDER — ATORVASTATIN CALCIUM 80 MG/1
1 TABLET, FILM COATED ORAL
Qty: 0 | Refills: 0 | DISCHARGE

## 2019-10-29 RX ORDER — EZETIMIBE 10 MG/1
1 TABLET ORAL
Qty: 30 | Refills: 0
Start: 2019-10-29 | End: 2019-11-27

## 2019-10-29 RX ORDER — ATORVASTATIN CALCIUM 80 MG/1
1 TABLET, FILM COATED ORAL
Qty: 30 | Refills: 0
Start: 2019-10-29 | End: 2019-11-27

## 2020-06-26 ENCOUNTER — TRANSCRIPTION ENCOUNTER (OUTPATIENT)
Age: 55
End: 2020-06-26

## 2020-11-13 NOTE — H&P ADULT - NSCORESITESY/N_GEN_A_CORE_RD
TRANSFER - OUT REPORT:    Verbal report given to Deirdre Heard, RN and Sirena Caballero, RN  on Mariangel Quinn  being transferred to EP and CCU respectivelyfor routine progression of care       Report consisted of patients Situation, Background, Assessment and   Recommendations(SBAR). Information from the following report(s) SBAR, OR Summary and Procedure Summary was reviewed with the receiving nurse. Lines:   Quad Lumen 11/13/20 (Active)       Peripheral IV 11/12/20 Anterior;Distal;Right Forearm (Active)   Site Assessment Clean, dry, & intact 11/13/20 0638   Phlebitis Assessment 0 11/13/20 0638   Infiltration Assessment 0 11/13/20 0638   Dressing Status Clean, dry, & intact 11/13/20 0638   Dressing Type Transparent 11/13/20 0638   Hub Color/Line Status Pink;Blue 11/13/20 3762       Arterial Line 11/13/20 Left Radial artery (Active)        Opportunity for questions and clarification was provided.       Patient transported with:   Monitor  O2 @ 10 liters via ET Tube and Ambu bag ventilations accompanied by CRNA, PA-C, Perfusionist and RN Circulator Yes

## 2021-06-14 NOTE — CONSULT NOTE ADULT - SUBJECTIVE AND OBJECTIVE BOX
Patient Education    BRIGHT FUTURES HANDOUT- PARENT  6 YEAR VISIT  Here are some suggestions from [employer] experts that may be of value to your family.     HOW YOUR FAMILY IS DOING  Spend time with your child. Hug and praise him.  Help your child do things for himself.  Help your child deal with conflict.  If you are worried about your living or food situation, talk with us. Community agencies and programs such as [employer] can also provide information and assistance.  Don t smoke or use e-cigarettes. Keep your home and car smoke-free. Tobacco-free spaces keep children healthy.  Don t use alcohol or drugs. If you re worried about a family member s use, let us know, or reach out to local or online resources that can help.    STAYING HEALTHY  Help your child brush his teeth twice a day  After breakfast  Before bed  Use a pea-sized amount of toothpaste with fluoride.  Help your child floss his teeth once a day.  Your child should visit the dentist at least twice a year.  Help your child be a healthy eater by  Providing healthy foods, such as vegetables, fruits, lean protein, and whole grains  Eating together as a family  Being a role model in what you eat  Buy fat-free milk and low-fat dairy foods. Encourage 2 to 3 servings each day.  Limit candy, soft drinks, juice, and sugary foods.  Make sure your child is active for 1 hour or more daily.  Don t put a TV in your child s bedroom.  Consider making a family media plan. It helps you make rules for media use and balance screen time with other activities, including exercise.    FAMILY RULES AND ROUTINES  Family routines create a sense of safety and security for your child.  Teach your child what is right and what is wrong.  Give your child chores to do and expect them to be done.  Use discipline to teach, not to punish.  Help your child deal with anger. Be a role model.  Teach your child to walk away when she is angry and do something else to calm down, such as playing  or reading.    READY FOR SCHOOL  Talk to your child about school.  Read books with your child about starting school.  Take your child to see the school and meet the teacher.  Help your child get ready to learn. Feed her a healthy breakfast and give her regular bedtimes so she gets at least 10 to 11 hours of sleep.  Make sure your child goes to a safe place after school.  If your child has disabilities or special health care needs, be active in the Individualized Education Program process.    SAFETY  Your child should always ride in the back seat (until at least 13 years of age) and use a forward-facing car safety seat or belt-positioning booster seat.  Teach your child how to safely cross the street and ride the school bus. Children are not ready to cross the street alone until 10 years or older.  Provide a properly fitting helmet and safety gear for riding scooters, biking, skating, in-line skating, skiing, snowboarding, and horseback riding.  Make sure your child learns to swim. Never let your child swim alone.  Use a hat, sun protection clothing, and sunscreen with SPF of 15 or higher on his exposed skin. Limit time outside when the sun is strongest (11:00 am-3:00 pm).  Teach your child about how to be safe with other adults.  No adult should ask a child to keep secrets from parents.  No adult should ask to see a child s private parts.  No adult should ask a child for help with the adult s own private parts.  Have working smoke and carbon monoxide alarms on every floor. Test them every month and change the batteries every year. Make a family escape plan in case of fire in your home.  If it is necessary to keep a gun in your home, store it unloaded and locked with the ammunition locked separately from the gun.  Ask if there are guns in homes where your child plays. If so, make sure they are stored safely.        Helpful Resources:  Family Media Use Plan: www.healthychildren.org/MediaUsePlan  Smoking Quit Line:  195.858.3530 Information About Car Safety Seats: www.safercar.gov/parents  Toll-free Auto Safety Hotline: 987.388.9883  Consistent with Bright Futures: Guidelines for Health Supervision of Infants, Children, and Adolescents, 4th Edition  For more information, go to https://brightfutures.aap.org.            Patient is a 54y old  Male who presents with a chief complaint of Chest pain (28 Oct 2019 09:05)      HPI:  54M from home, PMHx of CAD s/p quadruple bypass(2017), HTN, CML, DM presented to ED c/o chest pain. He complaints of 5-6/10 pin prick type, non radiating. worsening lt. sided chest pain since yesterday, no aggravating or relieving factor. He is complaint with his meds and follows cardiologist after bypass surgery and gets stress test every 6months, last stress test was 5month ago which was normal and he is due for his next after a month. He denies any shortness of breath, palpitation, cough, fever, N/V, urinary or bowel complaints. (26 Oct 2019 21:34)      PAST MEDICAL & SURGICAL HISTORY:  PNA (pneumonia)  HLD (hyperlipidemia)  CAD (coronary artery disease)  CML (chronic myelocytic leukemia)  Gout  Hypertension  DM (diabetes mellitus)  S/P CABG (coronary artery bypass graft)         MEDICATIONS  (STANDING):  aspirin enteric coated 81 milliGRAM(s) Oral daily  atorvastatin 40 milliGRAM(s) Oral at bedtime  Bosutinib  ( Bosulif) 400 milliGRAM(s) 400 milliGRAM(s) Oral daily  dextrose 10% Bolus 125 milliLiter(s) IV Bolus once  dextrose 10% Bolus 250 milliLiter(s) IV Bolus once  dextrose 5%. 1000 milliLiter(s) (50 mL/Hr) IV Continuous <Continuous>  heparin  Injectable 5000 Unit(s) SubCutaneous every 12 hours  insulin lispro (HumaLOG) corrective regimen sliding scale   SubCutaneous three times a day before meals  lisinopril 2.5 milliGRAM(s) Oral daily  metoprolol tartrate 25 milliGRAM(s) Oral two times a day  ranolazine 500 milliGRAM(s) Oral two times a day  tamsulosin 0.4 milliGRAM(s) Oral at bedtime    MEDICATIONS  (PRN):  dextrose 40% Gel 15 Gram(s) Oral once PRN Blood Glucose LESS THAN 70 milliGRAM(s)/deciLiter  glucagon  Injectable 1 milliGRAM(s) IntraMuscular once PRN Glucose <70 milliGRAM(s)/deciLiter  nitroglycerin     SubLingual 0.4 milliGRAM(s) SubLingual every 5 minutes PRN Chest Pain      FAMILY HISTORY:  Family history of stroke      SOCIAL HISTORY:      REVIEW OF SYSTEMS:  CONSTITUTIONAL: No fever, weight loss, or fatigue  EYES: No eye pain, visual disturbances, or discharge  ENT:  No difficulty hearing, tinnitus, vertigo; No sinus or throat pain  NECK: No pain or stiffness  RESPIRATORY: No cough, wheezing, chills or hemoptysis; No Shortness of Breath  CARDIOVASCULAR: No chest pain, palpitations, passing out, dizziness, or leg swelling  GASTROINTESTINAL: No abdominal or epigastric pain. No nausea, vomiting, or hematemesis; No diarrhea or constipation. No melena or hematochezia.  GENITOURINARY: No dysuria, frequency, hematuria, or incontinence  NEUROLOGICAL: No headaches, memory loss, loss of strength, numbness, or tremors  SKIN: No itching, burning, rashes, or lesions   LYMPH Nodes: No enlarged glands  ENDOCRINE: No heat or cold intolerance; No hair loss  MUSCULOSKELETAL: No joint pain or swelling; No muscle, back, or extremity pain  PSYCHIATRIC: No depression, anxiety, mood swings, or difficulty sleeping  HEME/LYMPH: No easy bruising, or bleeding gums  ALLERGY AND IMMUNOLOGIC: No hives or eczema	        Vital Signs Last 24 Hrs  T(C): 36.7 (28 Oct 2019 07:41), Max: 36.8 (27 Oct 2019 16:13)  T(F): 98 (28 Oct 2019 07:41), Max: 98.3 (27 Oct 2019 16:13)  HR: 61 (28 Oct 2019 07:41) (60 - 72)  BP: 128/57 (28 Oct 2019 07:41) (128/57 - 146/69)  BP(mean): --  RR: 16 (28 Oct 2019 07:41) (16 - 18)  SpO2: 98% (28 Oct 2019 07:41) (96% - 100%)      Constitutional:    NC/AT:    HEENT:    Neck:  No JVD, bruits or thyromegaly    Respiratory:  Clear without rales or rhonchi    Cardiovascular:  RR without murmur, rub or gallop.    Gastrointestinal: Soft without hepatosplenomegaly.    Extremities: without cyanosis, clubbing or edema.    Neurological:  Oriented   x      . No gross sensory or motor defects.        LABS:                        11.8   7.01  )-----------( 189      ( 28 Oct 2019 06:29 )             36.7     10-28    137  |  106  |  16  ----------------------------<  263<H>  4.9   |  28  |  0.88    Ca    8.9      28 Oct 2019 06:29  Phos  2.4     10-27  Mg     2.2     10-27    TPro  6.7  /  Alb  3.1<L>  /  TBili  0.3  /  DBili  x   /  AST  13  /  ALT  25  /  AlkPhos  77  10-28    CARDIAC MARKERS ( 27 Oct 2019 05:56 )  0.072 ng/mL / x     / 80 U/L / x     / 2.2 ng/mL  CARDIAC MARKERS ( 26 Oct 2019 23:34 )  0.076 ng/mL / x     / 97 U/L / x     / 2.1 ng/mL  CARDIAC MARKERS ( 26 Oct 2019 18:24 )  0.072 ng/mL / x     / x     / x     / x          PT/INR - ( 26 Oct 2019 18:24 )   PT: 10.7 sec;   INR: 0.97 ratio         PTT - ( 26 Oct 2019 18:24 )  PTT:30.8 sec    CAPILLARY BLOOD GLUCOSE      POCT Blood Glucose.: 253 mg/dL (28 Oct 2019 07:28)  POCT Blood Glucose.: 160 mg/dL (27 Oct 2019 16:37)  POCT Blood Glucose.: 346 mg/dL (27 Oct 2019 11:50)      RADIOLOGY & ADDITIONAL STUDIES: Patient is a 54y old  Male who presents with a chief complaint of Chest pain (28 Oct 2019 09:05)      HPI:  54M from home, PMHx of CAD s/p quadruple bypass(2017), HTN, CML, DM presented to ED c/o chest pain. He complaints of 5-6/10 pin prick type, non radiating. worsening lt. sided chest pain since yesterday, no aggravating or relieving factor. He is complaint with his meds and follows cardiologist after bypass surgery and gets stress test every 6months, last stress test was 5month ago which was normal and he is due for his next after a month. He denies any shortness of breath, palpitation, cough, fever, N/V, urinary or bowel complaints. Found to have un cont dm. Pt takes janumet bid and glipizide qd as out pt. Does not monitor FSG and denies hypoglycemic symptoms.       PAST MEDICAL & SURGICAL HISTORY:  PNA (pneumonia)  HLD (hyperlipidemia)  CAD (coronary artery disease)  CML (chronic myelocytic leukemia)  Gout  Hypertension  DM (diabetes mellitus)  S/P CABG (coronary artery bypass graft)         MEDICATIONS  (STANDING):  aspirin enteric coated 81 milliGRAM(s) Oral daily  atorvastatin 40 milliGRAM(s) Oral at bedtime  Bosutinib  ( Bosulif) 400 milliGRAM(s) 400 milliGRAM(s) Oral daily  dextrose 10% Bolus 125 milliLiter(s) IV Bolus once  dextrose 10% Bolus 250 milliLiter(s) IV Bolus once  dextrose 5%. 1000 milliLiter(s) (50 mL/Hr) IV Continuous <Continuous>  heparin  Injectable 5000 Unit(s) SubCutaneous every 12 hours  insulin lispro (HumaLOG) corrective regimen sliding scale   SubCutaneous three times a day before meals  lisinopril 2.5 milliGRAM(s) Oral daily  metoprolol tartrate 25 milliGRAM(s) Oral two times a day  ranolazine 500 milliGRAM(s) Oral two times a day  tamsulosin 0.4 milliGRAM(s) Oral at bedtime    MEDICATIONS  (PRN):  dextrose 40% Gel 15 Gram(s) Oral once PRN Blood Glucose LESS THAN 70 milliGRAM(s)/deciLiter  glucagon  Injectable 1 milliGRAM(s) IntraMuscular once PRN Glucose <70 milliGRAM(s)/deciLiter  nitroglycerin     SubLingual 0.4 milliGRAM(s) SubLingual every 5 minutes PRN Chest Pain      FAMILY HISTORY:  Family history of stroke      SOCIAL HISTORY:      REVIEW OF SYSTEMS:  CONSTITUTIONAL: No fever, weight loss, or fatigue  EYES: No eye pain, visual disturbances, or discharge  ENT:  No difficulty hearing, tinnitus, vertigo; No sinus or throat pain  NECK: No pain or stiffness  RESPIRATORY: No cough, wheezing, chills or hemoptysis; No Shortness of Breath  CARDIOVASCULAR: No chest pain, palpitations, passing out, dizziness, or leg swelling  GASTROINTESTINAL: No abdominal or epigastric pain. No nausea, vomiting, or hematemesis; No diarrhea or constipation. No melena or hematochezia.  GENITOURINARY: No dysuria, frequency, hematuria, or incontinence  NEUROLOGICAL: No headaches, memory loss, loss of strength, numbness, or tremors  SKIN: No itching, burning, rashes, or lesions   LYMPH Nodes: No enlarged glands  ENDOCRINE: No heat or cold intolerance; No hair loss  MUSCULOSKELETAL: No joint pain or swelling; No muscle, back, or extremity pain  PSYCHIATRIC: No depression, anxiety, mood swings, or difficulty sleeping  HEME/LYMPH: No easy bruising, or bleeding gums  ALLERGY AND IMMUNOLOGIC: No hives or eczema	        Vital Signs Last 24 Hrs  T(C): 36.7 (28 Oct 2019 07:41), Max: 36.8 (27 Oct 2019 16:13)  T(F): 98 (28 Oct 2019 07:41), Max: 98.3 (27 Oct 2019 16:13)  HR: 61 (28 Oct 2019 07:41) (60 - 72)  BP: 128/57 (28 Oct 2019 07:41) (128/57 - 146/69)  BP(mean): --  RR: 16 (28 Oct 2019 07:41) (16 - 18)  SpO2: 98% (28 Oct 2019 07:41) (96% - 100%)      Constitutional:    HEENT: nad    Neck:  No JVD, bruits or thyromegaly    Respiratory:  Clear without rales or rhonchi    Cardiovascular:  RR without murmur, rub or gallop.    Gastrointestinal: Soft without hepatosplenomegaly.    Extremities: without cyanosis, clubbing or edema.    Neurological:  Oriented   x   3   . No gross sensory or motor defects.        LABS:                        11.8   7.01  )-----------( 189      ( 28 Oct 2019 06:29 )             36.7     10-28    137  |  106  |  16  ----------------------------<  263<H>  4.9   |  28  |  0.88    Ca    8.9      28 Oct 2019 06:29  Phos  2.4     10-27  Mg     2.2     10-27    TPro  6.7  /  Alb  3.1<L>  /  TBili  0.3  /  DBili  x   /  AST  13  /  ALT  25  /  AlkPhos  77  10-28    CARDIAC MARKERS ( 27 Oct 2019 05:56 )  0.072 ng/mL / x     / 80 U/L / x     / 2.2 ng/mL  CARDIAC MARKERS ( 26 Oct 2019 23:34 )  0.076 ng/mL / x     / 97 U/L / x     / 2.1 ng/mL  CARDIAC MARKERS ( 26 Oct 2019 18:24 )  0.072 ng/mL / x     / x     / x     / x          PT/INR - ( 26 Oct 2019 18:24 )   PT: 10.7 sec;   INR: 0.97 ratio         PTT - ( 26 Oct 2019 18:24 )  PTT:30.8 sec    CAPILLARY BLOOD GLUCOSE      POCT Blood Glucose.: 253 mg/dL (28 Oct 2019 07:28)  POCT Blood Glucose.: 160 mg/dL (27 Oct 2019 16:37)  POCT Blood Glucose.: 346 mg/dL (27 Oct 2019 11:50)      RADIOLOGY & ADDITIONAL STUDIES:

## 2022-02-08 NOTE — ED PROVIDER NOTE - NSCAREINITIATED _GEN_ER
Annie Hamlin(Attending)
Patient BIBA to ED today from [facility] after having a witnessed syncopal episode there.  Patient reports he is homeless, having auditory hallucinations for about two weeks and is suicidal and intoxicated.

## 2022-05-04 NOTE — H&P ADULT. - [facility]
Billing Type: Third-Party Bill Bill For Surgical Tray: no Expected Date Of Service: 05/04/2022 Performing Laboratory: 0 no rub/regular rate and rhythm

## 2022-09-20 NOTE — H&P ADULT - PROBLEM SELECTOR PLAN 1
Previously Declined (within the last year) 106 - p/w chest pain  - EKG showed NSR@74bpm  - Troponins: T1: negative, FU T2 and T3  - KAYLEEN score 2, low risk  - HEART score 4, moderate risk  - on ASA, Lipitor + BB  - Telemetry monitoring  - TTE ordered  - Cardiology consult  - p/w chest pain  - EKG showed NSR@74bpm  - Troponins: T1: negative, FU T2 and T3  - KAYLEEN score 2, low risk  - HEART score 4, moderate risk  - on ASA, Lipitor + BB  - Telemetry monitoring  - TTE ordered  - Cardiology consult Dr Callaway

## 2022-09-24 NOTE — PATIENT PROFILE ADULT. - CAREGIVER
Problem: Skin/Tissue Integrity  Goal: Absence of new skin breakdown  Description: 1. Monitor for areas of redness and/or skin breakdown  2. Assess vascular access sites hourly  3. Every 4-6 hours minimum:  Change oxygen saturation probe site  4. Every 4-6 hours:  If on nasal continuous positive airway pressure, respiratory therapy assess nares and determine need for appliance change or resting period.   9/23/2022 2350 by Lea John RN  Outcome: Progressing  9/23/2022 1344 by Wendy Rahman RN  Outcome: Progressing     Problem: Safety - Adult  Goal: Free from fall injury  9/23/2022 2350 by Lea John RN  Outcome: Progressing  9/23/2022 1344 by Wendy Rahman RN  Outcome: Progressing     Problem: Chronic Conditions and Co-morbidities  Goal: Patient's chronic conditions and co-morbidity symptoms are monitored and maintained or improved  9/23/2022 2350 by Lea John RN  Outcome: Progressing  9/23/2022 1344 by Wendy Rahman RN  Outcome: Progressing     Problem: Discharge Planning  Goal: Discharge to home or other facility with appropriate resources  9/23/2022 2350 by Lea John RN  Outcome: Progressing  9/23/2022 1344 by Wendy Rahman RN  Outcome: Progressing     Problem: Nutrition Deficit:  Goal: Optimize nutritional status  9/23/2022 2350 by Lea John RN  Outcome: Progressing  9/23/2022 1344 by Wendy Rahman RN  Outcome: Progressing     Problem: Pain  Goal: Verbalizes/displays adequate comfort level or baseline comfort level  Outcome: Progressing Declines

## 2023-03-05 NOTE — DISCHARGE NOTE ADULT - MEDICATION SUMMARY - MEDICATIONS TO STOP TAKING
Pt to bathroom.  Urine collected and placed in specimen container I will STOP taking the medications listed below when I get home from the hospital:    Levemir FlexPen 100 units/mL subcutaneous solution  -- 44 unit(s) subcutaneous once a day (at bedtime)  -- Check with your doctor before becoming pregnant.  Do not drink alcoholic beverages when taking this medication.  Keep in refrigerator.  Do not freeze.    NovoLOG FlexPen 100 units/mL subcutaneous solution  -- 15 unit(s) subcutaneous 3 times a day (before meals)  -- Do not drink alcoholic beverages when taking this medication.  Keep in refrigerator.  Do not freeze.  Obtain medical advice before taking any non-prescription drugs as some may affect the action of this medication.    BD pen needles   -- 4 application needles subcutaneous 4 times a day 1 month supply

## 2023-05-16 NOTE — ED PROVIDER NOTE - CONSTITUTIONAL NEGATIVE STATEMENT, MLM
no fever and no chills. W Plasty Text: The lesion was extirpated to the level of the fat with a #15 scalpel blade.  Given the location of the defect, shape of the defect and the proximity to free margins a W-plasty was deemed most appropriate for repair.  Using a sterile surgical marker, the appropriate transposition arms of the W-plasty were drawn incorporating the defect and placing the expected incisions within the relaxed skin tension lines where possible.    The area thus outlined was incised deep to adipose tissue with a #15 scalpel blade.  The skin margins were undermined to an appropriate distance in all directions utilizing iris scissors.  The opposing transposition arms were then transposed into place in opposite direction and anchored with interrupted buried subcutaneous sutures.

## 2024-02-21 NOTE — PATIENT PROFILE ADULT. - HARM RISK FACTORS
Detail Level: Detailed Depth Of Biopsy: dermis Was A Bandage Applied: Yes Size Of Lesion In Cm: 0 Biopsy Type: H and E Biopsy Method: Dermablade Anesthesia Type: 1% lidocaine with epinephrine Anesthesia Volume In Cc: 0.5 Hemostasis: Drysol Wound Care: Petrolatum Dressing: bandage Destruction After The Procedure: No Type Of Destruction Used: Curettage Curettage Text: The wound bed was treated with curettage after the biopsy was performed. Cryotherapy Text: The wound bed was treated with cryotherapy after the biopsy was performed. Electrodesiccation Text: The wound bed was treated with electrodesiccation after the biopsy was performed. Electrodesiccation And Curettage Text: The wound bed was treated with electrodesiccation and curettage after the biopsy was performed. Silver Nitrate Text: The wound bed was treated with silver nitrate after the biopsy was performed. Consent: Written consent was obtained and risks were reviewed including but not limited to scarring, infection, bleeding, scabbing, incomplete removal, nerve damage and allergy to anesthesia. Post-Care Instructions: I reviewed with the patient in detail post-care instructions. Patient is to keep the biopsy site dry overnight, and then apply bacitracin twice daily until healed. Patient may apply hydrogen peroxide soaks to remove any crusting. Notification Instructions: Patient will be notified of biopsy results. However, patient instructed to call the office if not contacted within 2 weeks. Billing Type: Third-Party Bill Information: Selecting Yes will display possible errors in your note based on the variables you have selected. This validation is only offered as a suggestion for you. PLEASE NOTE THAT THE VALIDATION TEXT WILL BE REMOVED WHEN YOU FINALIZE YOUR NOTE. IF YOU WANT TO FAX A PRELIMINARY NOTE YOU WILL NEED TO TOGGLE THIS TO 'NO' IF YOU DO NOT WANT IT IN YOUR FAXED NOTE. no

## 2024-08-16 NOTE — DISCHARGE NOTE ADULT - VISION (WITH CORRECTIVE LENSES IF THE PATIENT USUALLY WEARS THEM):
Form signed by LIDIA Tompkins. Faxed along with copy of below AC hold guidelines to [facility] Team (fax [phone]).   Partially impaired: cannot see medication labels or newsprint, but can see obstacles in path, and the surrounding layout; can count fingers at arm's length/uses glasses for driving

## 2024-12-16 NOTE — ED PROVIDER NOTE - TIMING
Klonopin taper completed Sunday.   Start clonidine 0.1 mg p.o. twice daily for chronic benzo use/anxiety.   Blood pressure parameters in comment section of order.  Gabapentin 400mg TID  Atarax 50 mg TID  Continue Trazodone 100mg PO qHS for insomnia.   gradual onset

## 2025-03-19 NOTE — H&P CARDIOLOGY - [facility] (M2)
Goal Outcome Evaluation:    Summary: Acute hypoxic respiratory failure d/t CHF/asthma exacerbation, increased fatigue, UTI    DATE & TIME: 03/18/25 5306-5809    Cognitive Concerns/ Orientation: A&O x3, disoriented to situation. Forgetful at times. Voice hoarse - lost from coughing. [place] - bilateral hearing aids.  BEHAVIOR & AGGRESSION TOOL COLOR: Green  CIWA SCORE: NA   ABNL VS/O2: VSS on 2L NC, increased O2 needs this AM but weaned throughout shift.   MOBILITY: Ax1-2 GB/Walker. Up in chair this shift  PAIN MANAGMENT: C/O bilateral foot pain, managed with scheduled Tylenol  DIET: Regular  BOWEL/BLADDER: Inc of B&B. No BM this shift. Purewick in place with good UOP.  ABNL LAB/BG: ; Cr 0.97; BNP 6,725; influenza A (+) already completed 7 day course of Tamiflu and droplet precautions  DRAIN/DEVICES: R PIV SL  TELEMETRY RHYTHM: NA  SKIN: scattered scabs and bruises, blanchable redness to sacrum/coccyx  TESTS/PROCEDURES: CT of chest completed this shift  D/C DATE: pending  OTHER IMPORTANT INFO: Takes pills whole in applesauce. x1 dose of IV Lasix given. PT following.                       1.94